# Patient Record
Sex: FEMALE | Race: WHITE | Employment: UNEMPLOYED | URBAN - METROPOLITAN AREA
[De-identification: names, ages, dates, MRNs, and addresses within clinical notes are randomized per-mention and may not be internally consistent; named-entity substitution may affect disease eponyms.]

---

## 2024-01-19 ENCOUNTER — HOSPITAL ENCOUNTER (EMERGENCY)
Facility: HOSPITAL | Age: 24
Discharge: HOME | End: 2024-01-19
Attending: EMERGENCY MEDICINE
Payer: COMMERCIAL

## 2024-01-19 ENCOUNTER — APPOINTMENT (EMERGENCY)
Dept: ULTRASOUND IMAGING | Facility: HOSPITAL | Age: 24
End: 2024-01-19
Payer: COMMERCIAL

## 2024-01-19 ENCOUNTER — APPOINTMENT (EMERGENCY)
Dept: CT IMAGING | Facility: HOSPITAL | Age: 24
End: 2024-01-19
Payer: COMMERCIAL

## 2024-01-19 VITALS
WEIGHT: 165 LBS | RESPIRATION RATE: 16 BRPM | OXYGEN SATURATION: 98 % | BODY MASS INDEX: 30.36 KG/M2 | SYSTOLIC BLOOD PRESSURE: 113 MMHG | HEART RATE: 75 BPM | TEMPERATURE: 98.6 F | HEIGHT: 62 IN | DIASTOLIC BLOOD PRESSURE: 72 MMHG

## 2024-01-19 DIAGNOSIS — N70.91 SALPINGITIS: Primary | ICD-10-CM

## 2024-01-19 DIAGNOSIS — R10.30 LOWER ABDOMINAL PAIN: ICD-10-CM

## 2024-01-19 LAB
ALBUMIN SERPL BCP-MCNC: 4.5 G/DL (ref 3.5–5)
ALP SERPL-CCNC: 72 U/L (ref 34–104)
ALT SERPL W P-5'-P-CCNC: 13 U/L (ref 7–52)
ANION GAP SERPL CALCULATED.3IONS-SCNC: 7 MMOL/L
AST SERPL W P-5'-P-CCNC: 12 U/L (ref 13–39)
B-HCG SERPL-ACNC: 14 MIU/ML (ref 0–5)
BACTERIA UR QL AUTO: ABNORMAL /HPF
BASOPHILS # BLD AUTO: 0.02 THOUSANDS/ÂΜL (ref 0–0.1)
BASOPHILS NFR BLD AUTO: 0 % (ref 0–1)
BILIRUB SERPL-MCNC: 0.48 MG/DL (ref 0.2–1)
BILIRUB UR QL STRIP: NEGATIVE
BUN SERPL-MCNC: 7 MG/DL (ref 5–25)
C TRACH DNA SPEC QL NAA+PROBE: NEGATIVE
CALCIUM SERPL-MCNC: 9.2 MG/DL (ref 8.4–10.2)
CANDIDA RRNA VAG QL PROBE: NOT DETECTED
CHLORIDE SERPL-SCNC: 103 MMOL/L (ref 96–108)
CLARITY UR: CLEAR
CO2 SERPL-SCNC: 25 MMOL/L (ref 21–32)
COLOR UR: COLORLESS
CREAT SERPL-MCNC: 0.57 MG/DL (ref 0.6–1.3)
EOSINOPHIL # BLD AUTO: 0.1 THOUSAND/ÂΜL (ref 0–0.61)
EOSINOPHIL NFR BLD AUTO: 1 % (ref 0–6)
ERYTHROCYTE [DISTWIDTH] IN BLOOD BY AUTOMATED COUNT: 12.4 % (ref 11.6–15.1)
G VAGINALIS RRNA GENITAL QL PROBE: DETECTED
GFR SERPL CREATININE-BSD FRML MDRD: 131 ML/MIN/1.73SQ M
GLUCOSE SERPL-MCNC: 105 MG/DL (ref 65–140)
GLUCOSE UR STRIP-MCNC: NEGATIVE MG/DL
HCT VFR BLD AUTO: 39.2 % (ref 34.8–46.1)
HGB BLD-MCNC: 12.8 G/DL (ref 11.5–15.4)
HGB UR QL STRIP.AUTO: NEGATIVE
IMM GRANULOCYTES # BLD AUTO: 0.02 THOUSAND/UL (ref 0–0.2)
IMM GRANULOCYTES NFR BLD AUTO: 0 % (ref 0–2)
KETONES UR STRIP-MCNC: NEGATIVE MG/DL
LEUKOCYTE ESTERASE UR QL STRIP: ABNORMAL
LIPASE SERPL-CCNC: 21 U/L (ref 11–82)
LYMPHOCYTES # BLD AUTO: 1.79 THOUSANDS/ÂΜL (ref 0.6–4.47)
LYMPHOCYTES NFR BLD AUTO: 22 % (ref 14–44)
MCH RBC QN AUTO: 29.7 PG (ref 26.8–34.3)
MCHC RBC AUTO-ENTMCNC: 32.7 G/DL (ref 31.4–37.4)
MCV RBC AUTO: 91 FL (ref 82–98)
MONOCYTES # BLD AUTO: 0.38 THOUSAND/ÂΜL (ref 0.17–1.22)
MONOCYTES NFR BLD AUTO: 5 % (ref 4–12)
N GONORRHOEA DNA SPEC QL NAA+PROBE: NEGATIVE
NEUTROPHILS # BLD AUTO: 5.76 THOUSANDS/ÂΜL (ref 1.85–7.62)
NEUTS SEG NFR BLD AUTO: 72 % (ref 43–75)
NITRITE UR QL STRIP: NEGATIVE
NON-SQ EPI CELLS URNS QL MICRO: ABNORMAL /HPF
NRBC BLD AUTO-RTO: 0 /100 WBCS
PH UR STRIP.AUTO: 6.5 [PH]
PLATELET # BLD AUTO: 294 THOUSANDS/UL (ref 149–390)
PMV BLD AUTO: 10.2 FL (ref 8.9–12.7)
POTASSIUM SERPL-SCNC: 3.9 MMOL/L (ref 3.5–5.3)
PROT SERPL-MCNC: 7.9 G/DL (ref 6.4–8.4)
PROT UR STRIP-MCNC: NEGATIVE MG/DL
RBC # BLD AUTO: 4.31 MILLION/UL (ref 3.81–5.12)
RBC #/AREA URNS AUTO: ABNORMAL /HPF
SODIUM SERPL-SCNC: 135 MMOL/L (ref 135–147)
SP GR UR STRIP.AUTO: 1.01 (ref 1–1.03)
T VAGINALIS RRNA GENITAL QL PROBE: DETECTED
UROBILINOGEN UR STRIP-ACNC: <2 MG/DL
WBC # BLD AUTO: 8.07 THOUSAND/UL (ref 4.31–10.16)
WBC #/AREA URNS AUTO: ABNORMAL /HPF

## 2024-01-19 PROCEDURE — 36415 COLL VENOUS BLD VENIPUNCTURE: CPT

## 2024-01-19 PROCEDURE — 96365 THER/PROPH/DIAG IV INF INIT: CPT

## 2024-01-19 PROCEDURE — 96372 THER/PROPH/DIAG INJ SC/IM: CPT

## 2024-01-19 PROCEDURE — 85025 COMPLETE CBC W/AUTO DIFF WBC: CPT

## 2024-01-19 PROCEDURE — 80053 COMPREHEN METABOLIC PANEL: CPT

## 2024-01-19 PROCEDURE — 87491 CHLMYD TRACH DNA AMP PROBE: CPT

## 2024-01-19 PROCEDURE — 76856 US EXAM PELVIC COMPLETE: CPT

## 2024-01-19 PROCEDURE — 96375 TX/PRO/DX INJ NEW DRUG ADDON: CPT

## 2024-01-19 PROCEDURE — 87510 GARDNER VAG DNA DIR PROBE: CPT | Performed by: PHYSICIAN ASSISTANT

## 2024-01-19 PROCEDURE — 76830 TRANSVAGINAL US NON-OB: CPT

## 2024-01-19 PROCEDURE — 99285 EMERGENCY DEPT VISIT HI MDM: CPT

## 2024-01-19 PROCEDURE — 87660 TRICHOMONAS VAGIN DIR PROBE: CPT | Performed by: PHYSICIAN ASSISTANT

## 2024-01-19 PROCEDURE — 83690 ASSAY OF LIPASE: CPT

## 2024-01-19 PROCEDURE — 87591 N.GONORRHOEAE DNA AMP PROB: CPT

## 2024-01-19 PROCEDURE — 81001 URINALYSIS AUTO W/SCOPE: CPT

## 2024-01-19 PROCEDURE — 74177 CT ABD & PELVIS W/CONTRAST: CPT

## 2024-01-19 PROCEDURE — 96361 HYDRATE IV INFUSION ADD-ON: CPT

## 2024-01-19 PROCEDURE — 99284 EMERGENCY DEPT VISIT MOD MDM: CPT

## 2024-01-19 PROCEDURE — 87480 CANDIDA DNA DIR PROBE: CPT | Performed by: PHYSICIAN ASSISTANT

## 2024-01-19 PROCEDURE — 84702 CHORIONIC GONADOTROPIN TEST: CPT

## 2024-01-19 RX ORDER — METRONIDAZOLE 500 MG/1
500 TABLET ORAL ONCE
Status: COMPLETED | OUTPATIENT
Start: 2024-01-19 | End: 2024-01-19

## 2024-01-19 RX ORDER — DOXYCYCLINE HYCLATE 100 MG/1
100 CAPSULE ORAL 2 TIMES DAILY
Qty: 27 CAPSULE | Refills: 0 | Status: SHIPPED | OUTPATIENT
Start: 2024-01-19 | End: 2024-02-02

## 2024-01-19 RX ORDER — KETOROLAC TROMETHAMINE 30 MG/ML
15 INJECTION, SOLUTION INTRAMUSCULAR; INTRAVENOUS ONCE
Status: COMPLETED | OUTPATIENT
Start: 2024-01-19 | End: 2024-01-19

## 2024-01-19 RX ORDER — LIDOCAINE 50 MG/G
1 PATCH TOPICAL ONCE
Status: DISCONTINUED | OUTPATIENT
Start: 2024-01-19 | End: 2024-01-19 | Stop reason: HOSPADM

## 2024-01-19 RX ORDER — ACETAMINOPHEN 10 MG/ML
1000 INJECTION, SOLUTION INTRAVENOUS ONCE
Status: COMPLETED | OUTPATIENT
Start: 2024-01-19 | End: 2024-01-19

## 2024-01-19 RX ORDER — METRONIDAZOLE 500 MG/1
500 TABLET ORAL EVERY 12 HOURS SCHEDULED
Qty: 27 TABLET | Refills: 0 | Status: SHIPPED | OUTPATIENT
Start: 2024-01-19 | End: 2024-02-02

## 2024-01-19 RX ORDER — DOXYCYCLINE HYCLATE 100 MG/1
100 CAPSULE ORAL ONCE
Status: COMPLETED | OUTPATIENT
Start: 2024-01-19 | End: 2024-01-19

## 2024-01-19 RX ORDER — ONDANSETRON 2 MG/ML
4 INJECTION INTRAMUSCULAR; INTRAVENOUS ONCE
Status: COMPLETED | OUTPATIENT
Start: 2024-01-19 | End: 2024-01-19

## 2024-01-19 RX ADMIN — LIDOCAINE HYDROCHLORIDE 500 MG: 10 INJECTION, SOLUTION EPIDURAL; INFILTRATION; INTRACAUDAL; PERINEURAL at 09:08

## 2024-01-19 RX ADMIN — MORPHINE SULFATE 2 MG: 2 INJECTION, SOLUTION INTRAMUSCULAR; INTRAVENOUS at 04:51

## 2024-01-19 RX ADMIN — ONDANSETRON 4 MG: 2 INJECTION INTRAMUSCULAR; INTRAVENOUS at 03:55

## 2024-01-19 RX ADMIN — ACETAMINOPHEN 1000 MG: 10 INJECTION INTRAVENOUS at 03:55

## 2024-01-19 RX ADMIN — METRONIDAZOLE 500 MG: 500 TABLET ORAL at 09:08

## 2024-01-19 RX ADMIN — SODIUM CHLORIDE 1000 ML: 0.9 INJECTION, SOLUTION INTRAVENOUS at 03:56

## 2024-01-19 RX ADMIN — LIDOCAINE 5% 1 PATCH: 700 PATCH TOPICAL at 04:51

## 2024-01-19 RX ADMIN — IOHEXOL 100 ML: 350 INJECTION, SOLUTION INTRAVENOUS at 08:17

## 2024-01-19 RX ADMIN — DOXYCYCLINE HYCLATE 100 MG: 100 CAPSULE ORAL at 09:08

## 2024-01-19 RX ADMIN — KETOROLAC TROMETHAMINE 15 MG: 30 INJECTION, SOLUTION INTRAMUSCULAR; INTRAVENOUS at 03:55

## 2024-01-19 NOTE — DISCHARGE INSTRUCTIONS
Take antibiotics as prescribed. Do not drink alcohol while taking the antibiotic.    Take Tylenol 650mg and ibuprofen 600mg every 6 hours as needed for pain. Apply heating pad to affected area.    Please follow up with OBGYN on Monday. Return to the ER with any worsening symptoms, severe pain, fevers.

## 2024-01-19 NOTE — ED PROVIDER NOTES
History  Chief Complaint   Patient presents with    Abdominal Pain     X 1 week, had  a month ago but did not go to follow up now having back pain & abdominal cramping      Patient is a 23-year-old who presents to the emergency room today for 2 weeks of symptoms.  Patient reports that she had an  on 2023 in [place].  Reports ultrasound was performed and they confirmed IUP.  Of note, documented IUP not confirmed in the patient's chart.  Patient was given pills which she took as prescribed.  However, patient did not follow-up with  clinic.  Patient reports that she was seen by GI on 2024 for viral gastroenteritis.  Patient reports 2 weeks of abdominal pain, low back pain, nausea and vomiting.  Associated vaginal odor and discharge.  Denies any other symptoms at this time.  Has tried ibuprofen for symptomatic relief, has not taken any medication for symptom relief today.  Patient reports that she is not currently sexually active but would like to be tested for STIs.  Last menstrual period October.      Abdominal Pain  Associated symptoms: nausea, vaginal discharge (and vaginal odor) and vomiting    Associated symptoms: no chest pain, no chills, no dysuria, no fever, no hematuria and no shortness of breath        Prior to Admission Medications   Prescriptions Last Dose Informant Patient Reported? Taking?   Levonorgestrel (MIRENA) 20 MCG/DAY IUD   Yes No   Si each by Intrauterine route once   Patient not taking: Reported on 2024   Norethin-Eth Estrad-Fe Biphas (Lo Loestrin Fe) 1 MG-10 MCG / 10 MCG TABS   No No   Sig: Take 1 tablet by mouth in the morning   Patient not taking: Reported on 2024   dicyclomine (BENTYL) 10 mg capsule   No No   Sig: Take 1 capsule (10 mg total) by mouth 4 (four) times a day (before meals and at bedtime) for 5 days   ibuprofen (MOTRIN) 600 mg tablet  Self No No   Sig: Take 1 tablet (600 mg total) by mouth every 6 (six) hours as needed for mild  pain   ondansetron (ZOFRAN) 4 mg tablet   No No   Sig: Take 1 tablet (4 mg total) by mouth every 8 (eight) hours as needed for nausea or vomiting for up to 5 days   phenazopyridine (PYRIDIUM) 200 mg tablet   No No   Sig: Take 1 tablet (200 mg total) by mouth 3 (three) times a day   Patient not taking: Reported on 2024      Facility-Administered Medications: None       History reviewed. No pertinent past medical history.    Past Surgical History:   Procedure Laterality Date    INDUCED       [place] LAPS ABD PRTM&OMENTUM DX W/WO SPEC BR/WA SPX N/A 2020    Procedure: LAPAROSCOPY DIAGNOSTIC, left salpingectomy,;  Surgeon: Ivania Alston DO;  Location: [facility];  Service: Gynecology       Family History   Problem Relation Age of Onset    No Known Problems Mother     No Known Problems Father      I have reviewed and agree with the history as documented.    E-Cigarette/Vaping    E-Cigarette Use Never User      E-Cigarette/Vaping Substances    Nicotine No     THC No     CBD No     Flavoring No     Other No     Unknown No      Social History     Tobacco Use    Smoking status: Never    Smokeless tobacco: Never   Vaping Use    Vaping status: Never Used   Substance Use Topics    Alcohol use: No    Drug use: No       Review of Systems   Constitutional:  Negative for chills and fever.   HENT:  Negative for trouble swallowing and voice change.    Eyes:  Negative for photophobia and redness.   Respiratory:  Negative for shortness of breath.    Cardiovascular:  Negative for chest pain.   Gastrointestinal:  Positive for abdominal pain, nausea and vomiting. Negative for abdominal distention.   Genitourinary:  Positive for vaginal discharge (and vaginal odor). Negative for dysuria, flank pain and hematuria.   Musculoskeletal:  Positive for back pain (Bilateral low back pain). Negative for joint swelling.   Skin:  Negative for color change.   Neurological:  Negative for facial asymmetry and speech difficulty.    Psychiatric/Behavioral:  Negative for confusion.        Physical Exam  Physical Exam  Vitals and nursing note reviewed.   Constitutional:       General: She is not in acute distress.     Appearance: She is well-developed.   HENT:      Head: Normocephalic and atraumatic.   Eyes:      Conjunctiva/sclera: Conjunctivae normal.   Cardiovascular:      Rate and Rhythm: Normal rate and regular rhythm.      Heart sounds: No murmur heard.  Pulmonary:      Effort: Pulmonary effort is normal. No respiratory distress.      Breath sounds: Normal breath sounds.   Abdominal:      General: Abdomen is flat.      Palpations: Abdomen is soft.      Tenderness: There is abdominal tenderness in the right lower quadrant, suprapubic area and left lower quadrant. There is no right CVA tenderness or left CVA tenderness.   Musculoskeletal:         General: No swelling.      Cervical back: Neck supple.        Back:    Skin:     General: Skin is warm and dry.      Capillary Refill: Capillary refill takes less than 2 seconds.   Neurological:      Mental Status: She is alert.   Psychiatric:         Mood and Affect: Mood normal.         Vital Signs  ED Triage Vitals [01/19/24 0317]   Temperature Pulse Respirations Blood Pressure SpO2   98.6 °F (37 °C) 94 18 131/65 100 %      Temp Source Heart Rate Source Patient Position - Orthostatic VS BP Location FiO2 (%)   Oral Monitor Sitting Right arm --      Pain Score       9           Vitals:    01/19/24 0317 01/19/24 0358 01/19/24 0500 01/19/24 0600   BP: 131/65 110/74 113/72    Pulse: 94 73 64 75   Patient Position - Orthostatic VS: Sitting Lying Lying          Visual Acuity  Visual Acuity      Flowsheet Row Most Recent Value   L Pupil Size (mm) 3   R Pupil Size (mm) 3            ED Medications  Medications   sodium chloride 0.9 % bolus 1,000 mL (0 mL Intravenous Stopped 1/19/24 7055)   ondansetron (ZOFRAN) injection 4 mg (4 mg Intravenous Given 1/19/24 0373)   ketorolac (TORADOL) injection 15 mg (15  mg Intravenous Given 1/19/24 2205)   acetaminophen (Ofirmev) injection 1,000 mg (0 mg Intravenous Stopped 1/19/24 9503)   morphine injection 2 mg (2 mg Intravenous Given 1/19/24 7061)   iohexol (OMNIPAQUE) 350 MG/ML injection (MULTI-DOSE) 100 mL (100 mL Intravenous Given 1/19/24 0817)   cefTRIAXone (ROCEPHIN) 500 mg in lidocaine (PF) (XYLOCAINE-MPF) 1 % IM only syringe (500 mg Intramuscular Given 1/19/24 0908)   doxycycline hyclate (VIBRAMYCIN) capsule 100 mg (100 mg Oral Given 1/19/24 0908)   metroNIDAZOLE (FLAGYL) tablet 500 mg (500 mg Oral Given 1/19/24 0908)       Diagnostic Studies  Results Reviewed       Procedure Component Value Units Date/Time    Vaginosis DNA Probe [[id number]]  (Abnormal) Collected: 01/19/24    Lab Status: Final result Specimen: Genital Updated: 01/19/24 1323    Narrative:      The following orders were created for panel order Vaginosis DNA Probe.  Procedure                               Abnormality         Status                     ---------                               -----------         ------                     VAGINOSIS DNA PROBE (AFF...[[id number]]  Abnormal            Final result                 Please view results for these tests on the individual orders.    VAGINOSIS DNA PROBE (AFFIRM) [[id number]]  (Abnormal) Collected: 01/19/24    Lab Status: Final result Specimen: Genital Updated: 01/19/24 1323     Trichomonas vaginalis Detected     Gardnerella vaginalis Detected     Candida Species Not Detected    Narrative:      This test was performed using the FDA-approved BD Affirm™ VPIII Microbial Identification Test. This test is a DNA probe test based on the principles of nucleic acid hybridization. This test is intended for use in the detection and identification of Candida species, Gardnerella vaginalis, and Trichomonas vaginalis nucleic acid in vaginal fluid specimens.   All results should be interpreted in conjunction with clinical presentation and other laboratory markers.  Results are determined by the presence or absence of color on the teat bead.  “Detected” results for Candida, Gardnerella, and/or Trichomonas indicates that nucleic acid for Candida species (C. albicans, C. glabrata, C. kefyr, C. krusei, C. parapsilosis, C. tropicalis), G. vaginalis, and/or T. vaginalis, respectively, is present in the sample and indicates the patient has candidiasis, bacterial vaginosis, and/or trichomoniasis when consistent with clinical signs and symptoms. Simultaneous infections by more than one organism are common.   “Not Detected” results for Candida, Gardnerella, or Trichomonas indicates that nucleic acid for Candida species (C. albicans, C. glabrata, C. kefyr, C. krusei, C. parapsilosis, C. tropicalis), G. vaginalis, and/or T. vaginalis, respectively, is not present in the sample and suggests the patient does not have candidiasis, bacterial vaginosis, and/or trichomoniasis, respectively, when consistent with clinical signs and symptoms.   “Invalid” results for a sample indicate the specimen has repeated and results are invalid. Specimen recollection should be considered.     Procedural Limitations   The assay is intended to be used with the BD SGB VPIII Ambient Temperature Transport System, the WooMeIII Sample Collection Set, or the swabs provided in the BD Affirm VPIII Microbial Identification Kit. Other methods of collection have not been evaluated.  Optimal test results require appropriate specimen collection. Test results may be affected by improper specimen collection, handling and/or storage conditions. A negative test result does not exclude the possibility of vaginitis/vaginosis.  When using the BD Affirm VPIII Ambient Temperature Transport System, specimens held longer than 72 h at ambient (15 to 30 °C)or refrigerated (2 to 8 °C) conditions may cause false results. When using the BD SGB VPIII Sample Collection Kit or the swabs provided in the BD Affirm VPIII Microbial  Identification Kit, specimens held longer than 1 h at room temperature or 4 h at 2 to 8 °C prior to preparation may cause false results. Prepared specimens held longer than 24 h at room temperature prior to processing may give inaccurate results.  When performing this test, the temperature of the testing environment must be 22 to 28 °C.  A negative result for Candida, Gardnerella and/or Trichomonas indicates nucleic acid from less than 1 x 104 Candida cells, 2 x 105 CFU of G. vaginalis or 5 x 103 trichomonads, respectively, may be present in the patient sample.  The BD [employer] VPIII Microbial Identification Test detects the presence of G. vaginalis at concentrations of greater than 2 x 105 CFU per patient sample. The diagnostic value of this level of detection is not definitive.  The presence of G. vaginalis, although suggestive, is not diagnostic for bacterial vaginosis. As in many clinical situations, diagnosis should not be based on the results of a single laboratory test. Results should be interpreted in conjunction with other clinical and laboratory data available to the clinician such as pH, amine odor, clue cells and vaginal discharge characteristics.  Women with vaginal discharge should be evaluated for risk factors of cervicitis and pelvic inflammatory disease, and if present, evaluated for other organisms, including N. gonorrhoeae and C. trachomatis.  Vaginitis/Vaginosis is most frequently caused by G. vaginalis, Es species, and T. vaginalis. Vaginitis symptoms may also be seen in toxic shock syndrome (caused by Staphylococcus aureus) or may be caused by non-specific factors or by specific organisms. Mixed infections may occur. Therefore, a test indicating the presence of Candida species, G. vaginalis, and/or T. vaginalis, does not rule out the presence of other organisms, including Mobiluncus mulieris, Mycoplasma hominis, and/or Prevotella bivia.  Cryptococcus neoformans at concentrations greater than  1 x 108 yeast/mL react with the BD Affirm VPIII Microbial Identification Test for Candida species. C. neoformans is only rarely encountered in the vagina. M. mulieris at concentrations greater than 4 x 106 bacteria/mL and Bifidobacterium dentium at concentrations greater than 8 x 105 bacteria/mL may react non-specifically with the BD Affirm VPIII Microbial Identification Test for G. vaginalis. B. dentium is rarely encountered in the vagina.  The BD Affirm VPIII Microbial Identification Test method is for use with vaginal fluid specimens from patients with symptoms of vaginitis/vaginosis. Performance with other specimens or other patient populations has not been established.  The performance of this test on patient specimens collected during or immediately after antimicrobial therapy is unknown. The presence or absence of Candida species, G. vaginalis or T. vaginalis cannot be used as a test for therapeutic success or failure.   Adulteration of reagents or failure to follow instructions exactly as set forth in the directions for use may adversely affect performance as described in the labeling    Chlamydia/GC amplified DNA by PCR [[id number]]  (Normal) Collected: 01/19/24 0601    Lab Status: Final result Specimen: Urine, Other Updated: 01/19/24 1151     N gonorrhoeae, DNA Probe Negative     Chlamydia trachomatis, DNA Probe Negative    Narrative:      This test was performed using the FDA-approved Alireza 6800 CT/NG assay (Roche [employer]). This test uses real-time PCR to detect Chlamydia trachomatis (CT) and Neisseria gonorrhoeae (NG). This instrument and assay have been validated by the  and performing laboratory and verified by the performing laboratory.  This test is intended as an aid in the diagnosis of chlamydial and gonococcal disease. This test has not been evaluated in patients younger than 14 years of age and is not recommended for evaluation of suspected sexual abuse. This assay is not intended  to replace other exams or tests for diagnosis of urogenital infection by causative factors other than Chalmydia trachomatis (CT) and Neisseria gonorrhoeae (NG). Additional testing is recommended when the results do not correlate with clinical signs and symptoms.   Procedural Limitations  This assay has only been validated for use with male and female urine, clinician-instructed self-collected vaginal swab specimens, clinician-collected vaginal swab specimens, endocervical swab specimens collected in jr® PCR Media and cervical specimens collected in PreservCyt® Solution. Assay performance has not been validated for use with other collection media and/or specimen types.   Detection of C. trachomatis and N. gonorrhoeaea is dependent on the number of organisms present in the specimen. Detection may be affected by specimen collection methods, patient factors, stage of infection, infecting strains, and presence of polymerase/PCR inhibitors.   When CT is present at very high concentrations, the detection of NG present at concentrations near the limit of detection may be impacted.  The presence of mucus in endocervical specimens may lead to false negative results.  The presence of whole blood in urine and cervical specimens collected in PreservCyt Solution may lead to false negative and/or invalid test results.   Urine testing is recommended to be performed on first catch urine samples. The effects of other collection variables have not been evaluated at this time. The effects of vaginal discharge, tampon use, douching, and other collection variables have not been evaluated at this time.   This assay has not been evaluated with patients currently being treated with antimicrobial agents active against CT or NG, or patients with a history of hysterectomy.     Urine Microscopic [[id number]]  (Abnormal) Collected: 01/19/24 0601    Lab Status: Final result Specimen: Urine, Clean Catch Updated: 01/19/24 0634     RBC, UA 1-2  /hpf      WBC, UA 2-4 /hpf      Epithelial Cells Occasional /hpf      Bacteria, UA None Seen /hpf     UA w Reflex to Microscopic w Reflex to Culture [[id number]]  (Abnormal) Collected: 01/19/24 0601    Lab Status: Final result Specimen: Urine, Clean Catch Updated: 01/19/24 0630     Color, UA Colorless     Clarity, UA Clear     Specific Gravity, UA 1.009     pH, UA 6.5     Leukocytes, UA Small     Nitrite, UA Negative     Protein, UA Negative mg/dl      Glucose, UA Negative mg/dl      Ketones, UA Negative mg/dl      Urobilinogen, UA <2.0 mg/dl      Bilirubin, UA Negative     Occult Blood, UA Negative    Quantitative hCG [[id number]]  (Abnormal) Collected: 01/19/24 0349    Lab Status: Final result Specimen: Blood from Arm, Left Updated: 01/19/24 0428     HCG, Quant 14 mIU/mL     Narrative:       Expected Ranges:    HCG results between 5 and 25 mIU/mL may be indicative of early pregnancy but should be interpreted in light of the total clinical presentation.    HCG can rise to detectable levels in katherine and post menopausal women (0-11.6 mIU/mL).     Approximate               Approximate HCG  Gestation age          Concentration ( mIU/mL)  _____________          ______________________   Weeks                      HCG values  0.2-1                       5-50  1-2                           2-3                         100-5000  3-4                         500-92714  4-5                         1000-15385  5-6                         [id number]  6-8                         [id number]  8-12                        90999-495001      Comprehensive metabolic panel [[id number]]  (Abnormal) Collected: 01/19/24 0349    Lab Status: Final result Specimen: Blood from Arm, Left Updated: 01/19/24 0419     Sodium 135 mmol/L      Potassium 3.9 mmol/L      Chloride 103 mmol/L      CO2 25 mmol/L      ANION GAP 7 mmol/L      BUN 7 mg/dL      Creatinine 0.57 mg/dL      Glucose 105 mg/dL      Calcium 9.2 mg/dL      AST 12 U/L      ALT 13  U/L      Alkaline Phosphatase 72 U/L      Total Protein 7.9 g/dL      Albumin 4.5 g/dL      Total Bilirubin 0.48 mg/dL      eGFR 131 ml/min/1.73sq m     Narrative:      National Kidney Disease Foundation guidelines for Chronic Kidney Disease (CKD):     Stage 1 with normal or high GFR (GFR > 90 mL/min/1.73 square meters)    Stage 2 Mild CKD (GFR = 60-89 mL/min/1.73 square meters)    Stage 3A Moderate CKD (GFR = 45-59 mL/min/1.73 square meters)    Stage 3B Moderate CKD (GFR = 30-44 mL/min/1.73 square meters)    Stage 4 Severe CKD (GFR = 15-29 mL/min/1.73 square meters)    Stage 5 End Stage CKD (GFR <15 mL/min/1.73 square meters)  Note: GFR calculation is accurate only with a steady state creatinine    Lipase [[id number]]  (Normal) Collected: 01/19/24 0349    Lab Status: Final result Specimen: Blood from Arm, Left Updated: 01/19/24 0419     Lipase 21 u/L     CBC and differential [[id number]] Collected: 01/19/24 0349    Lab Status: Final result Specimen: Blood from Arm, Left Updated: 01/19/24 0400     WBC 8.07 Thousand/uL      RBC 4.31 Million/uL      Hemoglobin 12.8 g/dL      Hematocrit 39.2 %      MCV 91 fL      MCH 29.7 pg      MCHC 32.7 g/dL      RDW 12.4 %      MPV 10.2 fL      Platelets 294 Thousands/uL      nRBC 0 /100 WBCs      Neutrophils Relative 72 %      Immat GRANS % 0 %      Lymphocytes Relative 22 %      Monocytes Relative 5 %      Eosinophils Relative 1 %      Basophils Relative 0 %      Neutrophils Absolute 5.76 Thousands/µL      Immature Grans Absolute 0.02 Thousand/uL      Lymphocytes Absolute 1.79 Thousands/µL      Monocytes Absolute 0.38 Thousand/µL      Eosinophils Absolute 0.10 Thousand/µL      Basophils Absolute 0.02 Thousands/µL                    CT abdomen pelvis with contrast   Final Result by Randy Piper MD (01/19 0835)      No acute intra-abdominal abnormality.         Workstation performed: [id number]         US pelvis complete w transvaginal   Final Result by Florentino Cai MD  "( 653)      Thickening of the right fallopian tube up to 6 mm without significant hydrosalpinx or adnexal mass. If the patient has right pelvic pain, salpingitis could be considered.      No intrauterine gestation or retained products of conception.      Clinical follow-up will determine the need for repeat pelvic ultrasound in 3 to 6 weeks.      This examination was marked \"immediate notification\" in Epic in order to begin the standard process by which the radiology reading room liaison alerts the referring practitioner.            Workstation performed: [id number]                    Procedures  Procedures         ED Course  ED Course as of 246      0416 FAST US exam negative                               SBIRT 22yo+      Flowsheet Row Most Recent Value   MAYE: How many times in the past year have you...    Used an illegal drug or used a prescription medication for non-medical reasons? Never Filed at: 2024 0319                      Medical Decision Making  23-year-old female presenting today for abdominal pain, low back pain, nausea, vomiting, vaginal odor and discharge.  Recent   that she never followed up for.  Reports confirmed IUP, however, not documented in chart.  Vital signs stable.  On exam, patient has abdominal tenderness to the right lower quadrant, suprapubic area and left lower quadrant.  Additional bilateral musculoskeletal tenderness to lower back.  Negative FAST exam.  Labs unremarkable.  Ultrasound pelvis pending.  I gave signout to Rossana Iyer PA-C. See note.     Amount and/or Complexity of Data Reviewed  Labs: ordered.  Radiology: ordered.    Risk  Prescription drug management.             Disposition  Final diagnoses:   Salpingitis   Lower abdominal pain     Time reflects when diagnosis was documented in both MDM as applicable and the Disposition within this note       Time User Action Codes Description Comment    2024  9:04 AM " Rossana Iyer Add [N70.91] Salpingitis     1/19/2024  9:04 AM Rossana Iyer Add [R10.30] Lower abdominal pain           ED Disposition       ED Disposition   Discharge    Condition   Stable    Date/Time   Fri Jan 19, 2024 0904    Comment   Tamika Langford discharge to home/self care.                   Follow-up Information       Follow up With Specialties Details Why Contact Info Additional Information    Josie Mccray MD Obstetrics and Gynecology On 1/22/2024  [address] [place] [address]  [address]  [facility] [address]  [phone]       [facility] Emergency Medicine  If symptoms worsen [address] [facility] [id number] [phone] [facility], [address] [place], [place], [address]            Discharge Medication List as of 1/19/2024  9:06 AM        START taking these medications    Details   doxycycline hyclate (VIBRAMYCIN) 100 mg capsule Take 1 capsule (100 mg total) by mouth 2 (two) times a day for 14 days, Starting Fri 1/19/2024, Until Fri 2/2/2024, Normal      metroNIDAZOLE (FLAGYL) 500 mg tablet Take 1 tablet (500 mg total) by mouth every 12 (twelve) hours for 14 days, Starting Fri 1/19/2024, Until Fri 2/2/2024, Normal           CONTINUE these medications which have NOT CHANGED    Details   dicyclomine (BENTYL) 10 mg capsule Take 1 capsule (10 mg total) by mouth 4 (four) times a day (before meals and at bedtime) for 5 days, Starting Tue 1/9/2024, Until Sun 1/14/2024, Normal      ibuprofen (MOTRIN) 600 mg tablet Take 1 tablet (600 mg total) by mouth every 6 (six) hours as needed for mild pain, Starting Sun 4/5/2020, No Print      Levonorgestrel (MIRENA) 20 MCG/DAY IUD 1 each by Intrauterine route once, Historical Med      Norethin-Eth Estrad-Fe Biphas (Lo Loestrin Fe) 1 MG-10 MCG / 10 MCG TABS Take 1 tablet by mouth in the morning, Starting Mon 10/17/2022, Normal      ondansetron  (ZOFRAN) 4 mg tablet Take 1 tablet (4 mg total) by mouth every 8 (eight) hours as needed for nausea or vomiting for up to 5 days, Starting Tue 1/9/2024, Until Sun 1/14/2024 at 2359, Normal      phenazopyridine (PYRIDIUM) 200 mg tablet Take 1 tablet (200 mg total) by mouth 3 (three) times a day, Starting Tue 7/28/2020, Normal             No discharge procedures on file.    PDMP Review       None            ED Provider  Electronically Signed by             Venice Hatch PA-C  01/19/24 8283

## 2024-01-19 NOTE — ED CARE HANDOFF
Emergency Department Sign Out Note        Sign out and transfer of care from Venice Hatch PA-C. See Separate Emergency Department note.     The patient, Tamika Langford, was evaluated by the previous provider for abdominal pain.    Workup Completed:  Labs    Results Reviewed       Procedure Component Value Units Date/Time    Molecular Vaginal Panel [[id number]] Collected: 01/19/24 0833    Lab Status: In process Specimen: Genital from Vaginal Updated: 01/19/24 0841    Urine Microscopic [[id number]]  (Abnormal) Collected: 01/19/24 0601    Lab Status: Final result Specimen: Urine, Clean Catch Updated: 01/19/24 0634     RBC, UA 1-2 /hpf      WBC, UA 2-4 /hpf      Epithelial Cells Occasional /hpf      Bacteria, UA None Seen /hpf     UA w Reflex to Microscopic w Reflex to Culture [[id number]]  (Abnormal) Collected: 01/19/24 0601    Lab Status: Final result Specimen: Urine, Clean Catch Updated: 01/19/24 0630     Color, UA Colorless     Clarity, UA Clear     Specific Gravity, UA 1.009     pH, UA 6.5     Leukocytes, UA Small     Nitrite, UA Negative     Protein, UA Negative mg/dl      Glucose, UA Negative mg/dl      Ketones, UA Negative mg/dl      Urobilinogen, UA <2.0 mg/dl      Bilirubin, UA Negative     Occult Blood, UA Negative    Chlamydia/GC amplified DNA by PCR [[id number]] Collected: 01/19/24 0601    Lab Status: In process Specimen: Urine, Other Updated: 01/19/24 0610    Quantitative hCG [[id number]]  (Abnormal) Collected: 01/19/24 0349    Lab Status: Final result Specimen: Blood from Arm, Left Updated: 01/19/24 0428     HCG, Quant 14 mIU/mL     Narrative:       Expected Ranges:    HCG results between 5 and 25 mIU/mL may be indicative of early pregnancy but should be interpreted in light of the total clinical presentation.    HCG can rise to detectable levels in katherine and post menopausal women (0-11.6 mIU/mL).     Approximate               Approximate HCG  Gestation age          Concentration (  mIU/mL)  _____________          ______________________   Weeks                      HCG values  0.2-1                       5-50  1-2                           2-3                         100-5000  3-4                         500-51149  4-5                         1000-50953  5-6                         [id number]  6-8                         [id number]  8-12                        [id number]      Comprehensive metabolic panel [[id number]]  (Abnormal) Collected: 01/19/24 0349    Lab Status: Final result Specimen: Blood from Arm, Left Updated: 01/19/24 0419     Sodium 135 mmol/L      Potassium 3.9 mmol/L      Chloride 103 mmol/L      CO2 25 mmol/L      ANION GAP 7 mmol/L      BUN 7 mg/dL      Creatinine 0.57 mg/dL      Glucose 105 mg/dL      Calcium 9.2 mg/dL      AST 12 U/L      ALT 13 U/L      Alkaline Phosphatase 72 U/L      Total Protein 7.9 g/dL      Albumin 4.5 g/dL      Total Bilirubin 0.48 mg/dL      eGFR 131 ml/min/1.73sq m     Narrative:      National Kid[employer] guidelines for Chronic Kidney Disease (CKD):     Stage 1 with normal or high GFR (GFR > 90 mL/min/1.73 square meters)    Stage 2 Mild CKD (GFR = 60-89 mL/min/1.73 square meters)    Stage 3A Moderate CKD (GFR = 45-59 mL/min/1.73 square meters)    Stage 3B Moderate CKD (GFR = 30-44 mL/min/1.73 square meters)    Stage 4 Severe CKD (GFR = 15-29 mL/min/1.73 square meters)    Stage 5 End Stage CKD (GFR <15 mL/min/1.73 square meters)  Note: GFR calculation is accurate only with a steady state creatinine    Lipase [[id number]]  (Normal) Collected: 01/19/24 0349    Lab Status: Final result Specimen: Blood from Arm, Left Updated: 01/19/24 0419     Lipase 21 u/L     CBC and differential [[id number]] Collected: 01/19/24 0349    Lab Status: Final result Specimen: Blood from Arm, Left Updated: 01/19/24 0400     WBC 8.07 Thousand/uL      RBC 4.31 Million/uL      Hemoglobin 12.8 g/dL      Hematocrit 39.2 %      MCV 91 fL      MCH 29.7 pg      " MCHC 32.7 g/dL      RDW 12.4 %      MPV 10.2 fL      Platelets 294 Thousands/uL      nRBC 0 /100 WBCs      Neutrophils Relative 72 %      Immat GRANS % 0 %      Lymphocytes Relative 22 %      Monocytes Relative 5 %      Eosinophils Relative 1 %      Basophils Relative 0 %      Neutrophils Absolute 5.76 Thousands/µL      Immature Grans Absolute 0.02 Thousand/uL      Lymphocytes Absolute 1.79 Thousands/µL      Monocytes Absolute 0.38 Thousand/µL      Eosinophils Absolute 0.10 Thousand/µL      Basophils Absolute 0.02 Thousands/µL               ED Course / Workup Pending (followup):  TVUS        CT abdomen pelvis with contrast    Result Date: 1/19/2024  Impression: No acute intra-abdominal abnormality. Workstation performed: [id number]     US pelvis complete w transvaginal    Result Date: 1/19/2024  Impression: Thickening of the right fallopian tube up to 6 mm without significant hydrosalpinx or adnexal mass. If the patient has right pelvic pain, salpingitis could be considered. No intrauterine gestation or retained products of conception. Clinical follow-up will determine the need for repeat pelvic ultrasound in 3 to 6 weeks. This examination was marked \"immediate notification\" in Epic in order to begin the standard process by which the radiology reading room liaison alerts the referring practitioner. Workstation performed: [id number]           ED Course as of 01/19/24 0910   Fri Jan 19, 2024   0812 Imaging shows enlarged R fallopian tube, possible salpingitis. Patient evaluated. Pain is mostly left sided. Will add on CT scan to r/o alternative etiologies of her pain.    [id number] OBGYN paged.    3224 D/w Dr. Romero, on call OBGYN. She recommends treating for PID with IM Rocephin, doxy x 14 days, and Flagyl x 14 days. Patient has a f/u with OBGYN scheduled on Monday.            Procedures  Medical Decision Making  In brief, this is a 23yoF who is presenting with lower abdominal pain x 2 weeks. Also c/o watery " "vaginal discharge. Hx of medical  on 23. I received this patient in sign out awaiting pelvic ultrasound results. See previous provider note for full details. Labs show a quantitative HCG of 14 but are otherwise unremarkable.    Pelvic ultrasound shows \"Thickening of the right fallopian tube up to 6 mm without significant hydrosalpinx or adnexal mass. If the patient has right pelvic pain, salpingitis could be considered.\" CT added due to left sided pain on exam which is unremarkable. Pelvic exam performed. Patient had significant discomfort during exam although she states she typically has pain during exams. There was right adnexal tenderness present. No significant vaginal discharge noted. Molecular vaginal panel obtained. Discussed case with OBGYN who recommends treating patient for PID. IM Rocephin ordered and she was started on a 14 day course of doxycycline and Flagyl. She has an appt with OBGYN on Monday for f/u. ED return precautions discussed. Patient expressed understanding and is agreeable to plan. Patient discharged in stable condition.        Problems Addressed:  Lower abdominal pain: acute illness or injury  Salpingitis: acute illness or injury    Amount and/or Complexity of Data Reviewed  Labs: ordered.  Radiology: ordered.    Risk  Prescription drug management.            Disposition  Final diagnoses:   Salpingitis   Lower abdominal pain     Time reflects when diagnosis was documented in both MDM as applicable and the Disposition within this note       Time User Action Codes Description Comment    2024  9:04 AM Rossana Iyer Add [N70.91] Salpingitis     2024  9:04 AM Rossana Iyer Add [R10.30] Lower abdominal pain           ED Disposition       ED Disposition   Discharge    Condition   Stable    Date/Time     9:04 AM    Comment   Tamika Langford discharge to home/self care.                   Follow-up Information       Follow up With Specialties " Details Why Contact Info Additional Information    Josie Mccray MD Obstetrics and Gynecology On 1/22/2024  [address]  [address]  [place] [address]  [phone]       [facility] Emergency Department Emergency Medicine  If symptoms worsen [address] [facility] [id number] [phone] [facility] Emergency Department, [address] [place], [place], [address]          Patient's Medications   Discharge Prescriptions    DOXYCYCLINE HYCLATE (VIBRAMYCIN) 100 MG CAPSULE    Take 1 capsule (100 mg total) by mouth 2 (two) times a day for 14 days       Start Date: 1/19/2024 End Date: 2/2/2024       Order Dose: 100 mg       Quantity: 27 capsule    Refills: 0    METRONIDAZOLE (FLAGYL) 500 MG TABLET    Take 1 tablet (500 mg total) by mouth every 12 (twelve) hours for 14 days       Start Date: 1/19/2024 End Date: 2/2/2024       Order Dose: 500 mg       Quantity: 27 tablet    Refills: 0     No discharge procedures on file.       ED Provider  Electronically Signed by     Rossana Iyer PA-C  01/19/24 0915

## 2024-01-19 NOTE — RESULT ENCOUNTER NOTE
Patient called and notified of positive trichomonas and gardnerella testing. She prescribed Flagyl during ED visit. She was advised to notify all partners and f/u with OBGYN.

## 2024-01-22 ENCOUNTER — OFFICE VISIT (OUTPATIENT)
Dept: OBGYN CLINIC | Facility: MEDICAL CENTER | Age: 24
End: 2024-01-22
Payer: COMMERCIAL

## 2024-01-22 ENCOUNTER — APPOINTMENT (OUTPATIENT)
Dept: LAB | Facility: MEDICAL CENTER | Age: 24
End: 2024-01-22
Payer: COMMERCIAL

## 2024-01-22 VITALS — SYSTOLIC BLOOD PRESSURE: 116 MMHG | DIASTOLIC BLOOD PRESSURE: 74 MMHG | BODY MASS INDEX: 30.11 KG/M2 | WEIGHT: 164.6 LBS

## 2024-01-22 DIAGNOSIS — Z32.02 NEGATIVE PREGNANCY TEST: ICD-10-CM

## 2024-01-22 DIAGNOSIS — Z32.01 POSITIVE PREGNANCY TEST: ICD-10-CM

## 2024-01-22 DIAGNOSIS — N70.91 SALPINGITIS: ICD-10-CM

## 2024-01-22 DIAGNOSIS — Z30.013 ENCOUNTER FOR INITIAL PRESCRIPTION OF INJECTABLE CONTRACEPTIVE: Primary | ICD-10-CM

## 2024-01-22 PROBLEM — Z97.5 IUD (INTRAUTERINE DEVICE) IN PLACE: Status: RESOLVED | Noted: 2021-08-30 | Resolved: 2024-01-22

## 2024-01-22 LAB
B-HCG SERPL-ACNC: 9 MIU/ML (ref 0–5)
SL AMB POCT URINE HCG: NEGATIVE

## 2024-01-22 PROCEDURE — 36415 COLL VENOUS BLD VENIPUNCTURE: CPT

## 2024-01-22 PROCEDURE — 81025 URINE PREGNANCY TEST: CPT | Performed by: STUDENT IN AN ORGANIZED HEALTH CARE EDUCATION/TRAINING PROGRAM

## 2024-01-22 PROCEDURE — 84702 CHORIONIC GONADOTROPIN TEST: CPT

## 2024-01-22 PROCEDURE — 99213 OFFICE O/P EST LOW 20 MIN: CPT | Performed by: STUDENT IN AN ORGANIZED HEALTH CARE EDUCATION/TRAINING PROGRAM

## 2024-01-22 RX ORDER — MEDROXYPROGESTERONE ACETATE 150 MG/ML
150 INJECTION, SUSPENSION INTRAMUSCULAR
Qty: 1 ML | Refills: 4 | Status: SHIPPED | OUTPATIENT
Start: 2024-01-22

## 2024-01-22 NOTE — PROGRESS NOTES
Assessment/Plan:    Salpingitis  Pelvic pain and imaging findings concerning for salpingitis  Testing with trich and BV, neg GCCT  Given rocephin in the ER and recommended to take 2 wks doxy, flagyl  Repeat US planned at annual      Positive pregnancy test  HCG 14 in the ER 1 month after medical termination  Recommend repeat quant today to confirm resolution    Encounter for initial prescription of injectable contraceptive  Would like to trial depo     Diagnoses and all orders for this visit:    Encounter for initial prescription of injectable contraceptive  -     medroxyPROGESTERone (DEPO-PROVERA) 150 mg/mL injection; Inject 1 mL (150 mg total) into a muscle every 3 (three) months    Positive pregnancy test  -     hCG, quantitative; Future    Negative pregnancy test  -     POCT urine HCG    Salpingitis          Subjective:      Patient ID: Tamika Langford is a 23 y.o. female.    24 yo here for annual exam. Had a pregnancy 10-12/23. Termination with pills at end of December. Bled for 2 wks. 2 wks ago started with nausea, diarrhea, pelvic pain. Seen in UC and thought it was food poisoning, this has continued. She went to the ER for severe pelvic cramping and back pain. Found to have likely salpingitis. She has been feeling better since then with intermittent cramps. She reports she is taking her antibiotics. She is not currently sexually active but would like birth control. Aware to return for annual.         The following portions of the patient's history were reviewed and updated as appropriate: allergies, current medications, past family history, past medical history, past social history, past surgical history, and problem list.    Review of Systems   Constitutional:  Negative for chills and fever.   Respiratory:  Negative for shortness of breath.    Cardiovascular:  Negative for chest pain.   Gastrointestinal:  Negative for abdominal pain, constipation, diarrhea and nausea.   Genitourinary:  Positive for  pelvic pain. Negative for dyspareunia, dysuria, frequency, urgency, vaginal bleeding, vaginal discharge and vaginal pain.         Objective:      /74 (BP Location: Left arm, Patient Position: Sitting, Cuff Size: Standard)   Wt 74.7 kg (164 lb 9.6 oz)   LMP 10/27/2023 (Approximate)   BMI 30.11 kg/m²          Physical Exam  Vitals reviewed.   Constitutional:       General: She is not in acute distress.     Appearance: She is well-developed. She is not diaphoretic.   HENT:      Head: Normocephalic and atraumatic.   Pulmonary:      Effort: Pulmonary effort is normal. No respiratory distress.   Genitourinary:     Labia:         Right: No rash, tenderness, lesion or injury.         Left: No rash, tenderness, lesion or injury.       Vagina: No vaginal discharge, erythema, tenderness or bleeding.      Cervix: No cervical motion tenderness, friability, lesion, erythema or cervical bleeding.      Uterus: Not enlarged and not tender.       Adnexa: Right adnexa normal and left adnexa normal.        Right: No mass, tenderness or fullness.          Left: No mass, tenderness or fullness.     Musculoskeletal:      Cervical back: Normal range of motion.   Neurological:      Mental Status: She is alert and oriented to person, place, and time.   Psychiatric:         Behavior: Behavior normal.         Thought Content: Thought content normal.         Judgment: Judgment normal.

## 2024-01-22 NOTE — ASSESSMENT & PLAN NOTE
Pelvic pain and imaging findings concerning for salpingitis  Testing with trich and BV, neg GCCT  Given rocephin in the ER and recommended to take 2 wks doxy, flagyl  Repeat US planned at annual

## 2024-01-22 NOTE — ASSESSMENT & PLAN NOTE
HCG 14 in the ER 1 month after medical termination  Recommend repeat quant today to confirm resolution

## 2024-01-23 ENCOUNTER — OFFICE VISIT (OUTPATIENT)
Dept: OBGYN CLINIC | Facility: MEDICAL CENTER | Age: 24
End: 2024-01-23
Payer: COMMERCIAL

## 2024-01-23 VITALS — BODY MASS INDEX: 29.81 KG/M2 | WEIGHT: 163 LBS | SYSTOLIC BLOOD PRESSURE: 106 MMHG | DIASTOLIC BLOOD PRESSURE: 70 MMHG

## 2024-01-23 DIAGNOSIS — Z30.42 ENCOUNTER FOR DEPO-PROVERA CONTRACEPTION: Primary | ICD-10-CM

## 2024-01-23 DIAGNOSIS — Z32.01 POSITIVE PREGNANCY TEST: ICD-10-CM

## 2024-01-23 PROCEDURE — 96372 THER/PROPH/DIAG INJ SC/IM: CPT

## 2024-01-23 RX ORDER — MEDROXYPROGESTERONE ACETATE 150 MG/ML
150 INJECTION, SUSPENSION INTRAMUSCULAR ONCE
Status: COMPLETED | OUTPATIENT
Start: 2024-01-23 | End: 2024-01-23

## 2024-01-23 RX ADMIN — MEDROXYPROGESTERONE ACETATE 150 MG: 150 INJECTION, SUSPENSION INTRAMUSCULAR at 09:51

## 2024-01-23 NOTE — PROGRESS NOTES
Pt is here for Depo Provera injection. This is her first injection.  Her annual exam is scheduled 6/4/24.  Urine pregnancy test done: N-had negative pregnancy test at appointment on 1/22/24 per Dr Christina.      Result: NA  Depo given in L Buttock  Tolerated well.  Lot [id number] Exp 01/31/2026  Next dose due 4/9/24-4/23/24.   Refill needed No

## 2024-02-14 ENCOUNTER — TELEPHONE (OUTPATIENT)
Dept: OBGYN CLINIC | Facility: MEDICAL CENTER | Age: 24
End: 2024-02-14

## 2024-04-09 ENCOUNTER — CLINICAL SUPPORT (OUTPATIENT)
Dept: OBGYN CLINIC | Facility: MEDICAL CENTER | Age: 24
End: 2024-04-09
Payer: COMMERCIAL

## 2024-04-09 VITALS — WEIGHT: 165 LBS | BODY MASS INDEX: 30.18 KG/M2 | SYSTOLIC BLOOD PRESSURE: 104 MMHG | DIASTOLIC BLOOD PRESSURE: 80 MMHG

## 2024-04-09 DIAGNOSIS — Z30.42 ENCOUNTER FOR DEPO-PROVERA CONTRACEPTION: Primary | ICD-10-CM

## 2024-04-09 PROCEDURE — 96372 THER/PROPH/DIAG INJ SC/IM: CPT

## 2024-04-09 RX ORDER — MEDROXYPROGESTERONE ACETATE 150 MG/ML
150 INJECTION, SUSPENSION INTRAMUSCULAR ONCE
Status: COMPLETED | OUTPATIENT
Start: 2024-04-09 | End: 2024-04-09

## 2024-04-09 RX ADMIN — MEDROXYPROGESTERONE ACETATE 150 MG: 150 INJECTION, SUSPENSION INTRAMUSCULAR at 10:59

## 2024-04-09 NOTE — PROGRESS NOTES
Pt is here for Depo Provera injection. Her last dose was 1/23/24.  Her annual exam is scheduled for 6/4/24.  Urine pregnancy test done: N      Result: NA  Depo given in L Buttock  Tolerated well.  Lot [id number] Exp 3/2026  Next dose due 6/25-7/8.   Refill needed No

## 2024-04-29 ENCOUNTER — APPOINTMENT (EMERGENCY)
Dept: CT IMAGING | Facility: HOSPITAL | Age: 24
End: 2024-04-29
Payer: COMMERCIAL

## 2024-04-29 ENCOUNTER — HOSPITAL ENCOUNTER (EMERGENCY)
Facility: HOSPITAL | Age: 24
Discharge: HOME | End: 2024-04-29
Attending: EMERGENCY MEDICINE
Payer: COMMERCIAL

## 2024-04-29 VITALS
RESPIRATION RATE: 17 BRPM | HEART RATE: 65 BPM | SYSTOLIC BLOOD PRESSURE: 119 MMHG | DIASTOLIC BLOOD PRESSURE: 60 MMHG | OXYGEN SATURATION: 99 % | TEMPERATURE: 98.2 F

## 2024-04-29 DIAGNOSIS — S09.90XA INJURY OF HEAD, INITIAL ENCOUNTER: Primary | ICD-10-CM

## 2024-04-29 DIAGNOSIS — S06.0XAA CONCUSSION: ICD-10-CM

## 2024-04-29 LAB
EXT PREGNANCY TEST URINE: NEGATIVE
EXT. CONTROL: NORMAL

## 2024-04-29 PROCEDURE — 81025 URINE PREGNANCY TEST: CPT | Performed by: EMERGENCY MEDICINE

## 2024-04-29 PROCEDURE — 99284 EMERGENCY DEPT VISIT MOD MDM: CPT | Performed by: EMERGENCY MEDICINE

## 2024-04-29 PROCEDURE — 99284 EMERGENCY DEPT VISIT MOD MDM: CPT

## 2024-04-29 PROCEDURE — 96372 THER/PROPH/DIAG INJ SC/IM: CPT

## 2024-04-29 PROCEDURE — 70450 CT HEAD/BRAIN W/O DYE: CPT

## 2024-04-29 RX ORDER — KETOROLAC TROMETHAMINE 30 MG/ML
30 INJECTION, SOLUTION INTRAMUSCULAR; INTRAVENOUS ONCE
Status: COMPLETED | OUTPATIENT
Start: 2024-04-29 | End: 2024-04-29

## 2024-04-29 RX ORDER — ACETAMINOPHEN 325 MG/1
975 TABLET ORAL ONCE
Status: COMPLETED | OUTPATIENT
Start: 2024-04-29 | End: 2024-04-29

## 2024-04-29 RX ORDER — ONDANSETRON 4 MG/1
4 TABLET, FILM COATED ORAL EVERY 6 HOURS PRN
Qty: 12 TABLET | Refills: 0 | Status: SHIPPED | OUTPATIENT
Start: 2024-04-29

## 2024-04-29 RX ADMIN — ACETAMINOPHEN 975 MG: 325 TABLET, FILM COATED ORAL at 17:15

## 2024-04-29 RX ADMIN — KETOROLAC TROMETHAMINE 30 MG: 30 INJECTION, SOLUTION INTRAMUSCULAR; INTRAVENOUS at 17:15

## 2024-04-29 NOTE — ED PROVIDER NOTES
History  Chief Complaint   Patient presents with    Head Injury     Pt was getting out of car on Saturday when shoe got caught, pt states falling backwards onto ground. Pt denies any LOC, c/o now of L sided headache, nausea, and bump to back of head. Pt sent over by urgent care. No BT     24-year-old female no significant reported past history presenting with head injury.  Patient reports that 2 days ago she was trying to get out of a car when she lost her balance and fell striking the backside of her head.  Denies LOC.  Denies blood thinning medications.  Denies any neurological changes such as motor or sensory deficits.  Patient reports nausea without vomiting and photophobia and persistent headache.  Denies any chest pain shortness of breath.  Seen at urgent care earlier today and sent to ER for further evaluation and imaging.  Denies any other complaints.  Chart reviewed.    History reviewed. No pertinent past medical history.  Family History: non-contributory  Social History          Prior to Admission Medications   Prescriptions Last Dose Informant Patient Reported? Taking?   Levonorgestrel (MIRENA) 20 MCG/DAY IUD   Yes No   Si each by Intrauterine route once   Patient not taking: Reported on 2024   Norethin-Eth Estrad-Fe Biphas (Lo Loestrin Fe) 1 MG-10 MCG / 10 MCG TABS   No No   Sig: Take 1 tablet by mouth in the morning   Patient not taking: Reported on 2024   dicyclomine (BENTYL) 10 mg capsule   No No   Sig: Take 1 capsule (10 mg total) by mouth 4 (four) times a day (before meals and at bedtime) for 5 days   ibuprofen (MOTRIN) 600 mg tablet  Self No No   Sig: Take 1 tablet (600 mg total) by mouth every 6 (six) hours as needed for mild pain   Patient not taking: Reported on 2024   medroxyPROGESTERone (DEPO-PROVERA) 150 mg/mL injection   No No   Sig: Inject 1 mL (150 mg total) into a muscle every 3 (three) months   ondansetron (ZOFRAN) 4 mg tablet   No No   Sig: Take 1 tablet (4 mg total) by  mouth every 8 (eight) hours as needed for nausea or vomiting for up to 5 days   phenazopyridine (PYRIDIUM) 200 mg tablet   No No   Sig: Take 1 tablet (200 mg total) by mouth 3 (three) times a day   Patient not taking: Reported on 2024      Facility-Administered Medications: None       History reviewed. No pertinent past medical history.    Past Surgical History:   Procedure Laterality Date    INDUCED       TX LAPS ABD PRTM&OMENTUM DX W/WO SPEC BR/WA SPX N/A 2020    Procedure: LAPAROSCOPY DIAGNOSTIC, left salpingectomy,;  Surgeon: Ivania Alston DO;  Location: [facility];  Service: Gynecology       Family History   Problem Relation Age of Onset    No Known Problems Mother     No Known Problems Father      I have reviewed and agree with the history as documented.    E-Cigarette/Vaping    E-Cigarette Use Never User      E-Cigarette/Vaping Substances    Nicotine No     THC No     CBD No     Flavoring No     Other No     Unknown No      Social History     Tobacco Use    Smoking status: Never    Smokeless tobacco: Never   Vaping Use    Vaping status: Never Used   Substance Use Topics    Alcohol use: No    Drug use: No       Review of Systems   Constitutional:  Negative for appetite change, chills, diaphoresis, fever and unexpected weight change.   HENT:  Negative for congestion and rhinorrhea.    Eyes:  Positive for photophobia. Negative for visual disturbance.   Respiratory:  Negative for cough, chest tightness and shortness of breath.    Cardiovascular:  Negative for chest pain, palpitations and leg swelling.   Gastrointestinal:  Positive for nausea. Negative for abdominal distention, abdominal pain, blood in stool, constipation, diarrhea and vomiting.   Genitourinary:  Negative for dysuria and hematuria.   Musculoskeletal:  Negative for back pain, joint swelling, neck pain and neck stiffness.   Skin:  Negative for color change, pallor, rash and wound.   Neurological:  Positive for headaches. Negative  for dizziness, syncope, weakness and light-headedness.   Psychiatric/Behavioral:  Negative for agitation.    All other systems reviewed and are negative.      Physical Exam  Physical Exam  Vitals and nursing note reviewed.   Constitutional:       General: She is not in acute distress.     Appearance: Normal appearance. She is well-developed. She is not ill-appearing, toxic-appearing or diaphoretic.   HENT:      Head: Normocephalic.      Comments: Small hematoma with tenderness to left posterior occiput     Nose: Nose normal. No congestion or rhinorrhea.      Mouth/Throat:      Mouth: Mucous membranes are moist.      Pharynx: Oropharynx is clear. No oropharyngeal exudate or posterior oropharyngeal erythema.   Eyes:      General: No scleral icterus.        Right eye: No discharge.         Left eye: No discharge.      Extraocular Movements: Extraocular movements intact.      Conjunctiva/sclera: Conjunctivae normal.      Pupils: Pupils are equal, round, and reactive to light.   Neck:      Vascular: No JVD.      Trachea: No tracheal deviation.      Comments: Supple. Normal range of motion.   Cardiovascular:      Rate and Rhythm: Normal rate and regular rhythm.      Heart sounds: Normal heart sounds. No murmur heard.     No friction rub. No gallop.      Comments: Normal rate and regular rhythm  Pulmonary:      Effort: Pulmonary effort is normal. No respiratory distress.      Breath sounds: Normal breath sounds. No stridor. No wheezing or rales.      Comments: Clear to auscultation bilaterally  Chest:      Chest wall: No tenderness.   Abdominal:      General: Bowel sounds are normal. There is no distension.      Palpations: Abdomen is soft.      Tenderness: There is no abdominal tenderness. There is no right CVA tenderness, left CVA tenderness, guarding or rebound.      Comments: Soft, nontender, nondistended.  Normal bowel sounds throughout   Musculoskeletal:         General: No swelling, tenderness, deformity or signs of  injury. Normal range of motion.      Cervical back: Normal range of motion and neck supple. No rigidity or tenderness. No muscular tenderness.      Right lower leg: No edema.      Left lower leg: No edema.   Lymphadenopathy:      Cervical: No cervical adenopathy.   Skin:     General: Skin is warm and dry.      Coloration: Skin is not pale.      Findings: No erythema or rash.   Neurological:      General: No focal deficit present.      Mental Status: She is alert. Mental status is at baseline.      Sensory: No sensory deficit.      Motor: No weakness or abnormal muscle tone.      Coordination: Coordination normal.      Gait: Gait normal.      Comments: Alert.  Strength and sensation grossly intact.  Ambulatory without difficulty at baseline.    Psychiatric:         Behavior: Behavior normal.         Thought Content: Thought content normal.         Vital Signs  ED Triage Vitals [04/29/24 1610]   Temperature Pulse Respirations Blood Pressure SpO2   98.2 °F (36.8 °C) 65 17 119/60 99 %      Temp Source Heart Rate Source Patient Position - Orthostatic VS BP Location FiO2 (%)   Oral Monitor Sitting Left arm --      Pain Score       7           Vitals:    04/29/24 1610   BP: 119/60   Pulse: 65   Patient Position - Orthostatic VS: Sitting         Visual Acuity      ED Medications  Medications   acetaminophen (TYLENOL) tablet 975 mg (975 mg Oral Given 4/29/24 1715)   ketorolac (TORADOL) injection 30 mg (30 mg Intramuscular Given 4/29/24 1715)       Diagnostic Studies  Results Reviewed       Procedure Component Value Units Date/Time    POCT pregnancy, urine [[id number]]  (Normal) Resulted: 04/29/24 1714    Lab Status: Final result Updated: 04/29/24 1714     EXT Preg Test, Ur Negative     Control Valid                   CT head without contrast   Final Result by Martin Sheth MD (04/29 1903)      No acute intracranial abnormality.                  Workstation performed: [id number]                     Procedures  Procedures         ED Course                                             Medical Decision Making  24-year-old female no significant reported past history presenting with head injury.  Constellation of symptoms consistent with likely concussion.  Sent in for imaging.  Plan for CT imaging.  Symptom management with oral and IM medications.  No current nausea.  Reassess.    Symptoms improving with medications.  CT no significant acute process.  Suspect likely concussion.  Concussion and head injury precautions. Discussed results and recommendations. Advised follow up PCP. Medication recommendations. Given instructions and return precautions. Patient/family at bedside acknowledged understanding of all written and verbal instructions and return precautions. Discharged.     Amount and/or Complexity of Data Reviewed  Labs: ordered.  Radiology: ordered.    Risk  OTC drugs.  Prescription drug management.             Disposition  Final diagnoses:   Injury of head, initial encounter   Concussion     Time reflects when diagnosis was documented in both MDM as applicable and the Disposition within this note       Time User Action Codes Description Comment    4/29/2024  5:10 PM William Prado Add [S09.90XA] Injury of head, initial encounter     4/29/2024  5:10 PM William Prado Add [S06.0XAA] Concussion           ED Disposition       ED Disposition   Discharge    Condition   Stable    Date/Time   Mon Apr 29, 2024  6:39 PM    Comment   Tamika Langford discharge to home/self care.                   Follow-up Information       Follow up With Specialties Details Why Contact Info    [employer]  Call  for [phone]              Discharge Medication List as of 4/29/2024  6:39 PM        CONTINUE these medications which have CHANGED    Details   ondansetron (ZOFRAN) 4 mg tablet Take 1 tablet (4 mg total) by mouth every 6 (six) hours as needed for nausea or vomiting, Starting Mon 4/29/2024, Normal           CONTINUE  these medications which have NOT CHANGED    Details   dicyclomine (BENTYL) 10 mg capsule Take 1 capsule (10 mg total) by mouth 4 (four) times a day (before meals and at bedtime) for 5 days, Starting Tue 1/9/2024, Until Mon 1/22/2024, Normal      ibuprofen (MOTRIN) 600 mg tablet Take 1 tablet (600 mg total) by mouth every 6 (six) hours as needed for mild pain, Starting Sun 4/5/2020, No Print      Levonorgestrel (MIRENA) 20 MCG/DAY IUD 1 each by Intrauterine route once, Historical Med      medroxyPROGESTERone (DEPO-PROVERA) 150 mg/mL injection Inject 1 mL (150 mg total) into a muscle every 3 (three) months, Starting Mon 1/22/2024, Normal      Norethin-Eth Estrad-Fe Biphas (Lo Loestrin Fe) 1 MG-10 MCG / 10 MCG TABS Take 1 tablet by mouth in the morning, Starting Mon 10/17/2022, Normal      phenazopyridine (PYRIDIUM) 200 mg tablet Take 1 tablet (200 mg total) by mouth 3 (three) times a day, Starting Tue 7/28/2020, Normal             No discharge procedures on file.    PDMP Review       None            ED Provider  Electronically Signed by             William Prado MD  04/29/24 9712

## 2024-06-26 ENCOUNTER — CLINICAL SUPPORT (OUTPATIENT)
Dept: OBGYN CLINIC | Facility: CLINIC | Age: 24
End: 2024-06-26
Payer: COMMERCIAL

## 2024-06-26 VITALS — BODY MASS INDEX: 30.73 KG/M2 | HEIGHT: 62 IN | WEIGHT: 167 LBS

## 2024-06-26 DIAGNOSIS — Z30.42 ENCOUNTER FOR DEPO-PROVERA CONTRACEPTION: Primary | ICD-10-CM

## 2024-06-26 PROCEDURE — 96372 THER/PROPH/DIAG INJ SC/IM: CPT

## 2024-06-26 RX ORDER — MEDROXYPROGESTERONE ACETATE 150 MG/ML
150 INJECTION, SUSPENSION INTRAMUSCULAR ONCE
Status: COMPLETED | OUTPATIENT
Start: 2024-06-26 | End: 2024-06-26

## 2024-06-26 RX ADMIN — MEDROXYPROGESTERONE ACETATE 150 MG: 150 INJECTION, SUSPENSION INTRAMUSCULAR at 10:52

## 2024-06-26 NOTE — PROGRESS NOTES
Pt is here for Depo Provera injection. Her last dose was 4/9/2024.  Her annual exam was on 6/4/2024.  Urine pregnancy test done: No Patient is in the safe window dates      Depo given in  Left buttock   Tolerated well. Yes  Lot [id number] Exp 07/31/2026  Next dose due 9/11/2024--9/25/2024.   Refill needed NO       Patient reports no concerns on the depo she states that it has help out with her cramps .

## 2024-09-18 ENCOUNTER — TELEPHONE (OUTPATIENT)
Age: 24
End: 2024-09-18

## 2024-09-18 NOTE — TELEPHONE ENCOUNTER
Patient called to reschedule her depo shot realized she had it written down for 91/6, attempted to warm transfer with no answer. Please advise.

## 2024-09-19 NOTE — TELEPHONE ENCOUNTER
Left message for Ms. Langford to call and reschedule her Depo Injection.  Her last injection was administered on 6/26/24.  She can be rescheduled up until 9/25/24.  Please advise.

## 2024-09-25 ENCOUNTER — CLINICAL SUPPORT (OUTPATIENT)
Age: 24
End: 2024-09-25
Payer: COMMERCIAL

## 2024-09-25 VITALS
HEIGHT: 62 IN | DIASTOLIC BLOOD PRESSURE: 62 MMHG | BODY MASS INDEX: 31.58 KG/M2 | WEIGHT: 171.6 LBS | HEART RATE: 80 BPM | TEMPERATURE: 97.3 F | SYSTOLIC BLOOD PRESSURE: 102 MMHG

## 2024-09-25 DIAGNOSIS — Z30.42 ENCOUNTER FOR MANAGEMENT AND INJECTION OF DEPO-PROVERA: Primary | ICD-10-CM

## 2024-09-25 PROCEDURE — 96372 THER/PROPH/DIAG INJ SC/IM: CPT

## 2024-09-25 RX ORDER — MEDROXYPROGESTERONE ACETATE 150 MG/ML
150 INJECTION, SUSPENSION INTRAMUSCULAR ONCE
Status: COMPLETED | OUTPATIENT
Start: 2024-09-25 | End: 2024-09-25

## 2024-09-25 RX ADMIN — MEDROXYPROGESTERONE ACETATE 150 MG: 150 INJECTION, SUSPENSION INTRAMUSCULAR at 12:23

## 2024-09-25 NOTE — PROGRESS NOTES
Pt is here for Depo Provera injection. Her last dose was 6/26/2024.  Her annual exam was on 1/22/2024.  Urine pregnancy test done: No  Depo given in R Buttock  Tolerated well.  Lot [id number] Exp 11/30/2026  Next dose due 12/11-12/25/2024.   Refill needed No

## 2024-12-11 ENCOUNTER — ANNUAL EXAM (OUTPATIENT)
Age: 24
End: 2024-12-11
Payer: COMMERCIAL

## 2024-12-11 VITALS
HEIGHT: 62 IN | DIASTOLIC BLOOD PRESSURE: 64 MMHG | BODY MASS INDEX: 31.47 KG/M2 | SYSTOLIC BLOOD PRESSURE: 104 MMHG | WEIGHT: 171 LBS | HEART RATE: 81 BPM

## 2024-12-11 DIAGNOSIS — Z01.419 ENCOUNTER FOR GYNECOLOGICAL EXAMINATION (GENERAL) (ROUTINE) WITHOUT ABNORMAL FINDINGS: Primary | ICD-10-CM

## 2024-12-11 DIAGNOSIS — N63.14 MASS OF LOWER INNER QUADRANT OF RIGHT BREAST: ICD-10-CM

## 2024-12-11 DIAGNOSIS — Z11.3 SCREEN FOR STD (SEXUALLY TRANSMITTED DISEASE): ICD-10-CM

## 2024-12-11 PROCEDURE — 99395 PREV VISIT EST AGE 18-39: CPT | Performed by: OBSTETRICS & GYNECOLOGY

## 2024-12-11 PROCEDURE — 87491 CHLMYD TRACH DNA AMP PROBE: CPT | Performed by: OBSTETRICS & GYNECOLOGY

## 2024-12-11 PROCEDURE — G0145 SCR C/V CYTO,THINLAYER,RESCR: HCPCS | Performed by: OBSTETRICS & GYNECOLOGY

## 2024-12-11 PROCEDURE — 87591 N.GONORRHOEAE DNA AMP PROB: CPT | Performed by: OBSTETRICS & GYNECOLOGY

## 2024-12-11 NOTE — PROGRESS NOTES
"A:  Yearly exam.     P:   - Pap collected today. Reviewed ASCCP guidelines for pap smear  - STI testing, gcct from pap   - gardasil up to date  - diet and exercise reviewed  - pt stopping depo, reviewed return of menses sp depo.   - check breast u/s for small breast lump under right breast    RTO for yearly exam or sooner prn      CC:  Yearly exam    S:  24 y.o. female here for yearly exam.   She has been using depo provera for birth control- amenorrheic on depo.   Noticed small \"cyst\" under right breast- it was painful and swollen.   Was hard and tender- thinks it has resolved.     Was using depo provera for menstrual regulation  Very heavy.   Wants to take a break     Menarche: 14 years old.     LMP: amenorrheic on depo  Contraception: depo, abstinence  Last Pap: due  Last Mammo: n/a  Last Colonoscopy: n/a    Non-smoker, social drinker  Denies drug use  Exercises irregularly, balanced diet     Sexual activity: She is not sexually active without pain, bleeding or dryness.     STD testing:  She does want STD testing today.     Gardasil:  She has had the Gardasil series.     Family hx of breast cancer: denies  Family hx of ovarian cancer: denies  Family hx of colon cancer:  denies      Current Outpatient Medications:     medroxyPROGESTERone (DEPO-PROVERA) 150 mg/mL injection, Inject 1 mL (150 mg total) into a muscle every 3 (three) months, Disp: 1 mL, Rfl: 4    dicyclomine (BENTYL) 10 mg capsule, Take 1 capsule (10 mg total) by mouth 4 (four) times a day (before meals and at bedtime) for 5 days (Patient not taking: Reported on 2024), Disp: 20 capsule, Rfl: 0    ibuprofen (MOTRIN) 600 mg tablet, Take 1 tablet (600 mg total) by mouth every 6 (six) hours as needed for mild pain (Patient not taking: Reported on 2024), Disp: 30 tablet, Rfl: 0    ondansetron (ZOFRAN) 4 mg tablet, Take 1 tablet (4 mg total) by mouth every 8 (eight) hours as needed for nausea or vomiting for up to 5 days (Patient not taking: " Reported on 12/11/2024), Disp: 15 tablet, Rfl: 0    ondansetron (ZOFRAN) 4 mg tablet, Take 1 tablet (4 mg total) by mouth every 6 (six) hours as needed for nausea or vomiting (Patient not taking: Reported on 12/11/2024), Disp: 12 tablet, Rfl: 0    phenazopyridine (PYRIDIUM) 200 mg tablet, Take 1 tablet (200 mg total) by mouth 3 (three) times a day (Patient not taking: Reported on 12/11/2024), Disp: 6 tablet, Rfl: 0  Social History     Socioeconomic History    Marital status: Single     Spouse name: Not on file    Number of children: Not on file    Years of education: Not on file    Highest education level: Not on file   Occupational History    Not on file   Tobacco Use    Smoking status: Never    Smokeless tobacco: Never   Vaping Use    Vaping status: Never Used   Substance and Sexual Activity    Alcohol use: No    Drug use: No    Sexual activity: Not Currently     Partners: Male     Birth control/protection: Condom Male, Injection   Other Topics Concern    Not on file   Social History Narrative    Not on file     Social Drivers of Health     Financial Resource Strain: Not on file   Food Insecurity: Not on file   Transportation Needs: Not on file   Physical Activity: Not on file   Stress: Not on file   Social Connections: Unknown (6/18/2024)    Received from [employer]     How often do you feel lonely or isolated from those around you? (Adult - for ages 18 years and over): Not on file   Intimate Partner Violence: Not on file   Housing Stability: Not on file     Family History   Problem Relation Age of Onset    No Known Problems Mother     No Known Problems Father       History reviewed. No pertinent past medical history.     Review of Systems   Respiratory: Negative.    Cardiovascular: Negative.    Gastrointestinal: Negative for constipation and diarrhea.   Genitourinary: Negative for difficulty urinating, pelvic pain, vaginal bleeding, vaginal discharge, itching or odor.    O:  Blood pressure  "104/64, pulse 81, height 5' 2\" (1.575 m), weight 77.6 kg (171 lb), not currently breastfeeding.    Exam was chaperoned.  Patient appears well and is not in distress  Neck is supple without masses  Breasts are symmetrical without nipple discharge, skin changes or adenopathy.   Right breast with small area of discoloration, tenderness and induration vs discrete mass on underside of breast  Abdomen is soft and nontender without masses.   External genitals are normal without lesions or rashes.  Urethral meatus and urethra are normal  Bladder is normal to palpation  Vagina is normal without discharge or bleeding.   Cervix is normal without discharge or lesion.   Uterus is normal, mobile, nontender without palpable mass.  Adnexa are normal, nontender, without palpable mass.   Rectum without abnormality     "

## 2024-12-13 LAB
C TRACH DNA SPEC QL NAA+PROBE: NEGATIVE
N GONORRHOEA DNA SPEC QL NAA+PROBE: NEGATIVE

## 2024-12-16 ENCOUNTER — RESULTS FOLLOW-UP (OUTPATIENT)
Dept: OBGYN CLINIC | Facility: CLINIC | Age: 24
End: 2024-12-16

## 2024-12-16 ENCOUNTER — HOSPITAL ENCOUNTER (OUTPATIENT)
Dept: ULTRASOUND IMAGING | Facility: CLINIC | Age: 24
Discharge: HOME | End: 2024-12-16
Payer: COMMERCIAL

## 2024-12-16 DIAGNOSIS — N63.14 MASS OF LOWER INNER QUADRANT OF RIGHT BREAST: ICD-10-CM

## 2024-12-16 PROCEDURE — 76642 ULTRASOUND BREAST LIMITED: CPT

## 2024-12-18 LAB
LAB AP GYN PRIMARY INTERPRETATION: NORMAL
Lab: NORMAL

## 2025-01-21 ENCOUNTER — OFFICE VISIT (OUTPATIENT)
Age: 25
End: 2025-01-21
Payer: COMMERCIAL

## 2025-01-21 VITALS
OXYGEN SATURATION: 98 % | SYSTOLIC BLOOD PRESSURE: 92 MMHG | TEMPERATURE: 98.7 F | RESPIRATION RATE: 18 BRPM | HEART RATE: 65 BPM | DIASTOLIC BLOOD PRESSURE: 60 MMHG | WEIGHT: 175 LBS | BODY MASS INDEX: 32.01 KG/M2

## 2025-01-21 DIAGNOSIS — U07.1 COVID-19: Primary | ICD-10-CM

## 2025-01-21 PROCEDURE — G0382 LEV 3 HOSP TYPE B ED VISIT: HCPCS | Performed by: NURSE PRACTITIONER

## 2025-01-21 PROCEDURE — S9083 URGENT CARE CENTER GLOBAL: HCPCS | Performed by: NURSE PRACTITIONER

## 2025-01-21 NOTE — LETTER
January 21, 2025     Patient: Tamika Langford   YOB: 2000   Date of Visit: 1/21/2025       To Whom It May Concern:    It is my medical opinion that Tamika Langford may return to work on 1/23/2025  .  Patient was also feeling ill on 1/20/25.     If you have any questions or concerns, please don't hesitate to call.         Sincerely,        CHRISTELLE Preston    CC: No Recipients

## 2025-01-21 NOTE — PROGRESS NOTES
Assessment/Plan    COVID-19 [U07.1]  1. COVID-19          Rapid COVID was positive   Start daily emergen C while symptoms persist   PRN afrin daily x 24-48 hrs should nose bleeds persist   Trial OTC cough syrup as instructed on label   PRN motrin/tylenol q8hrs for pain/fever  F/u with PCP as needed  Proceed to ER should symptoms worsen      Patient ID: Tamika Langford is a 24 y.o. female.      Reason For Visit / Chief Complaint  Chief Complaint   Patient presents with    Cold Like Symptoms     Symptoms started yesterday, pt complains of random bloody nose when blowing nose, sinus congestion, and dry cough.         23 y/o female presents for congestion, dry cough, hot flashes and nose bleed after blowing her nose x 24 hrs. Patient took dayquill, nyquill and tea which did not help her symptoms. Recent exposure to dad who was sick about a week ago with COVID.       History reviewed. No pertinent past medical history.    Past Surgical History:   Procedure Laterality Date    INDUCED       MI LAPS ABD PRTM&OMENTUM DX W/WO SPEC BR/WA SPX N/A 2020    Procedure: LAPAROSCOPY DIAGNOSTIC, left salpingectomy,;  Surgeon: Ivania Alston DO;  Location: [facility];  Service: Gynecology       Family History   Problem Relation Age of Onset    No Known Problems Mother     No Known Problems Father     No Known Problems Sister     No Known Problems Maternal Grandmother     No Known Problems Maternal Grandfather     No Known Problems Paternal Grandmother     No Known Problems Paternal Grandfather        Review of Systems   Constitutional:  Positive for chills.   HENT:  Positive for congestion and nosebleeds.    Eyes: Negative.    Respiratory:  Positive for cough.    Cardiovascular: Negative.    Gastrointestinal: Negative.    Endocrine: Negative.    Genitourinary: Negative.    Musculoskeletal: Negative.    Skin: Negative.    Allergic/Immunologic: Negative.    Neurological: Negative.    Hematological: Negative.     Psychiatric/Behavioral: Negative.         Objective:    BP 92/60 (BP Location: Left arm, Patient Position: Sitting, Cuff Size: Standard)   Pulse 65   Temp 98.7 °F (37.1 °C) (Tympanic)   Resp 18   Wt 79.4 kg (175 lb)   SpO2 98%   BMI 32.01 kg/m²     Physical Exam  Constitutional:       Appearance: Normal appearance.   HENT:      Head: Normocephalic and atraumatic.      Right Ear: Tympanic membrane, ear canal and external ear normal.      Left Ear: Tympanic membrane, ear canal and external ear normal.      Nose: Congestion present.      Right Nostril: No epistaxis.      Left Nostril: No epistaxis.      Right Turbinates: Swollen.      Left Turbinates: Swollen.      Mouth/Throat:      Mouth: Mucous membranes are moist.      Pharynx: Oropharynx is clear. Posterior oropharyngeal erythema present.   Eyes:      Conjunctiva/sclera: Conjunctivae normal.   Cardiovascular:      Rate and Rhythm: Normal rate.      Pulses: Normal pulses.      Heart sounds: Normal heart sounds.   Pulmonary:      Effort: Pulmonary effort is normal.   Abdominal:      Palpations: Abdomen is soft.   Musculoskeletal:         General: Normal range of motion.      Cervical back: Normal range of motion.   Skin:     General: Skin is warm and dry.      Capillary Refill: Capillary refill takes less than 2 seconds.   Neurological:      Mental Status: She is alert.   Psychiatric:         Behavior: Behavior normal.         Thought Content: Thought content normal.

## 2025-01-21 NOTE — PATIENT INSTRUCTIONS
Rapid COVID was positive   Start daily emergen C while symptoms persist   PRN afrin daily x 24-48 hrs should nose bleeds persist   Trial OTC cough syrup as instructed on label   PRN motrin/tylenol q8hrs for pain/fever  F/u with PCP as needed  Proceed to ER should symptoms worsen

## 2025-02-02 ENCOUNTER — HOSPITAL ENCOUNTER (EMERGENCY)
Facility: HOSPITAL | Age: 25
Discharge: HOME | End: 2025-02-02
Attending: EMERGENCY MEDICINE
Payer: COMMERCIAL

## 2025-02-02 ENCOUNTER — APPOINTMENT (EMERGENCY)
Dept: RADIOLOGY | Facility: HOSPITAL | Age: 25
End: 2025-02-02
Payer: COMMERCIAL

## 2025-02-02 VITALS
WEIGHT: 171.74 LBS | TEMPERATURE: 99.5 F | SYSTOLIC BLOOD PRESSURE: 115 MMHG | DIASTOLIC BLOOD PRESSURE: 59 MMHG | HEART RATE: 102 BPM | OXYGEN SATURATION: 97 % | RESPIRATION RATE: 18 BRPM | BODY MASS INDEX: 31.41 KG/M2

## 2025-02-02 DIAGNOSIS — R68.89 FLU-LIKE SYMPTOMS: Primary | ICD-10-CM

## 2025-02-02 DIAGNOSIS — T78.40XA ALLERGIC REACTION, INITIAL ENCOUNTER: ICD-10-CM

## 2025-02-02 LAB
ANION GAP SERPL CALCULATED.3IONS-SCNC: 7 MMOL/L (ref 4–13)
BASOPHILS # BLD AUTO: 0.01 THOUSANDS/ΜL (ref 0–0.1)
BASOPHILS NFR BLD AUTO: 0 % (ref 0–1)
BUN SERPL-MCNC: 7 MG/DL (ref 5–25)
CALCIUM SERPL-MCNC: 8.8 MG/DL (ref 8.4–10.2)
CHLORIDE SERPL-SCNC: 106 MMOL/L (ref 96–108)
CO2 SERPL-SCNC: 25 MMOL/L (ref 21–32)
CREAT SERPL-MCNC: 0.61 MG/DL (ref 0.6–1.3)
EOSINOPHIL # BLD AUTO: 0.02 THOUSAND/ΜL (ref 0–0.61)
EOSINOPHIL NFR BLD AUTO: 0 % (ref 0–6)
ERYTHROCYTE [DISTWIDTH] IN BLOOD BY AUTOMATED COUNT: 12.4 % (ref 11.6–15.1)
FLUAV AG UPPER RESP QL IA.RAPID: NEGATIVE
FLUBV AG UPPER RESP QL IA.RAPID: NEGATIVE
GFR SERPL CREATININE-BSD FRML MDRD: 127 ML/MIN/1.73SQ M
GLUCOSE SERPL-MCNC: 146 MG/DL (ref 65–140)
HCT VFR BLD AUTO: 37.5 % (ref 34.8–46.1)
HGB BLD-MCNC: 12.6 G/DL (ref 11.5–15.4)
IMM GRANULOCYTES # BLD AUTO: 0.01 THOUSAND/UL (ref 0–0.2)
IMM GRANULOCYTES NFR BLD AUTO: 0 % (ref 0–2)
LYMPHOCYTES # BLD AUTO: 0.46 THOUSANDS/ΜL (ref 0.6–4.47)
LYMPHOCYTES NFR BLD AUTO: 9 % (ref 14–44)
MCH RBC QN AUTO: 29.9 PG (ref 26.8–34.3)
MCHC RBC AUTO-ENTMCNC: 33.6 G/DL (ref 31.4–37.4)
MCV RBC AUTO: 89 FL (ref 82–98)
MONOCYTES # BLD AUTO: 0.48 THOUSAND/ΜL (ref 0.17–1.22)
MONOCYTES NFR BLD AUTO: 10 % (ref 4–12)
NEUTROPHILS # BLD AUTO: 4.09 THOUSANDS/ΜL (ref 1.85–7.62)
NEUTS SEG NFR BLD AUTO: 81 % (ref 43–75)
NRBC BLD AUTO-RTO: 0 /100 WBCS
PLATELET # BLD AUTO: 198 THOUSANDS/UL (ref 149–390)
PMV BLD AUTO: 10.1 FL (ref 8.9–12.7)
POTASSIUM SERPL-SCNC: 3.4 MMOL/L (ref 3.5–5.3)
RBC # BLD AUTO: 4.21 MILLION/UL (ref 3.81–5.12)
SARS-COV+SARS-COV-2 AG RESP QL IA.RAPID: NEGATIVE
SODIUM SERPL-SCNC: 138 MMOL/L (ref 135–147)
WBC # BLD AUTO: 5.07 THOUSAND/UL (ref 4.31–10.16)

## 2025-02-02 PROCEDURE — 96375 TX/PRO/DX INJ NEW DRUG ADDON: CPT

## 2025-02-02 PROCEDURE — 93005 ELECTROCARDIOGRAM TRACING: CPT

## 2025-02-02 PROCEDURE — 96376 TX/PRO/DX INJ SAME DRUG ADON: CPT

## 2025-02-02 PROCEDURE — 36415 COLL VENOUS BLD VENIPUNCTURE: CPT | Performed by: EMERGENCY MEDICINE

## 2025-02-02 PROCEDURE — 87811 SARS-COV-2 COVID19 W/OPTIC: CPT | Performed by: EMERGENCY MEDICINE

## 2025-02-02 PROCEDURE — 85025 COMPLETE CBC W/AUTO DIFF WBC: CPT | Performed by: EMERGENCY MEDICINE

## 2025-02-02 PROCEDURE — 96365 THER/PROPH/DIAG IV INF INIT: CPT

## 2025-02-02 PROCEDURE — 87804 INFLUENZA ASSAY W/OPTIC: CPT | Performed by: EMERGENCY MEDICINE

## 2025-02-02 PROCEDURE — 96361 HYDRATE IV INFUSION ADD-ON: CPT

## 2025-02-02 PROCEDURE — 99284 EMERGENCY DEPT VISIT MOD MDM: CPT | Performed by: EMERGENCY MEDICINE

## 2025-02-02 PROCEDURE — 99283 EMERGENCY DEPT VISIT LOW MDM: CPT

## 2025-02-02 PROCEDURE — 71046 X-RAY EXAM CHEST 2 VIEWS: CPT

## 2025-02-02 PROCEDURE — 80048 BASIC METABOLIC PNL TOTAL CA: CPT | Performed by: EMERGENCY MEDICINE

## 2025-02-02 RX ORDER — FAMOTIDINE 20 MG/1
20 TABLET, FILM COATED ORAL 2 TIMES DAILY
Qty: 30 TABLET | Refills: 0 | Status: SHIPPED | OUTPATIENT
Start: 2025-02-02

## 2025-02-02 RX ORDER — KETOROLAC TROMETHAMINE 30 MG/ML
15 INJECTION, SOLUTION INTRAMUSCULAR; INTRAVENOUS ONCE
Status: COMPLETED | OUTPATIENT
Start: 2025-02-02 | End: 2025-02-02

## 2025-02-02 RX ORDER — DIPHENHYDRAMINE HYDROCHLORIDE 50 MG/ML
25 INJECTION INTRAMUSCULAR; INTRAVENOUS ONCE
Status: COMPLETED | OUTPATIENT
Start: 2025-02-02 | End: 2025-02-02

## 2025-02-02 RX ORDER — METHYLPREDNISOLONE SODIUM SUCCINATE 125 MG/2ML
60 INJECTION, POWDER, LYOPHILIZED, FOR SOLUTION INTRAMUSCULAR; INTRAVENOUS ONCE
Status: COMPLETED | OUTPATIENT
Start: 2025-02-02 | End: 2025-02-02

## 2025-02-02 RX ORDER — FAMOTIDINE 10 MG/ML
20 INJECTION, SOLUTION INTRAVENOUS ONCE
Status: COMPLETED | OUTPATIENT
Start: 2025-02-02 | End: 2025-02-02

## 2025-02-02 RX ORDER — PREDNISONE 20 MG/1
40 TABLET ORAL DAILY
Qty: 8 TABLET | Refills: 0 | Status: SHIPPED | OUTPATIENT
Start: 2025-02-02 | End: 2025-02-06

## 2025-02-02 RX ORDER — EPINEPHRINE 0.3 MG/.3ML
0.3 INJECTION SUBCUTANEOUS ONCE
Qty: 0.6 ML | Refills: 0 | Status: SHIPPED | OUTPATIENT
Start: 2025-02-02 | End: 2025-02-02

## 2025-02-02 RX ORDER — DIPHENHYDRAMINE HCL 25 MG
25 TABLET ORAL EVERY 6 HOURS
Qty: 20 TABLET | Refills: 0 | Status: SHIPPED | OUTPATIENT
Start: 2025-02-02

## 2025-02-02 RX ORDER — ACETAMINOPHEN 10 MG/ML
1000 INJECTION, SOLUTION INTRAVENOUS ONCE
Status: COMPLETED | OUTPATIENT
Start: 2025-02-02 | End: 2025-02-02

## 2025-02-02 RX ORDER — ALBUTEROL SULFATE 90 UG/1
2 INHALANT RESPIRATORY (INHALATION) ONCE
Status: COMPLETED | OUTPATIENT
Start: 2025-02-02 | End: 2025-02-02

## 2025-02-02 RX ADMIN — DIPHENHYDRAMINE HYDROCHLORIDE 25 MG: 50 INJECTION, SOLUTION INTRAMUSCULAR; INTRAVENOUS at 21:49

## 2025-02-02 RX ADMIN — ALBUTEROL SULFATE 2 PUFF: 90 AEROSOL, METERED RESPIRATORY (INHALATION) at 21:51

## 2025-02-02 RX ADMIN — SODIUM CHLORIDE 1000 ML: 0.9 INJECTION, SOLUTION INTRAVENOUS at 20:37

## 2025-02-02 RX ADMIN — ACETAMINOPHEN 1000 MG: 10 INJECTION INTRAVENOUS at 20:32

## 2025-02-02 RX ADMIN — FAMOTIDINE 20 MG: 10 INJECTION, SOLUTION INTRAVENOUS at 20:40

## 2025-02-02 RX ADMIN — DIPHENHYDRAMINE HYDROCHLORIDE 25 MG: 50 INJECTION, SOLUTION INTRAMUSCULAR; INTRAVENOUS at 20:33

## 2025-02-02 RX ADMIN — KETOROLAC TROMETHAMINE 15 MG: 30 INJECTION, SOLUTION INTRAMUSCULAR at 20:33

## 2025-02-02 RX ADMIN — METHYLPREDNISOLONE SODIUM SUCCINATE 60 MG: 125 INJECTION, POWDER, FOR SOLUTION INTRAMUSCULAR; INTRAVENOUS at 20:33

## 2025-02-03 LAB
ATRIAL RATE: 117 BPM
P AXIS: 70 DEGREES
PR INTERVAL: 140 MS
QRS AXIS: 64 DEGREES
QRSD INTERVAL: 86 MS
QT INTERVAL: 318 MS
QTC INTERVAL: 443 MS
T WAVE AXIS: 14 DEGREES
VENTRICULAR RATE: 117 BPM

## 2025-02-03 PROCEDURE — 93010 ELECTROCARDIOGRAM REPORT: CPT | Performed by: INTERNAL MEDICINE

## 2025-02-03 NOTE — ED PROVIDER NOTES
Time reflects when diagnosis was documented in both MDM as applicable and the Disposition within this note       Time User Action Codes Description Comment    2/2/2025 10:17 PM Jojo Clayton [R68.89] Flu-like symptoms     2/2/2025 10:17 PM Jojo Clayton [T78.40XA] Allergic reaction, initial encounter           ED Disposition       ED Disposition   Discharge    Condition   Stable    Date/Time   Sun Feb 2, 2025 10:17 PM    Comment   Tamika Langford discharge to home/self care.                   Assessment & Plan       Medical Decision Making  Amount and/or Complexity of Data Reviewed  Labs: ordered.  Radiology: ordered.    Risk  OTC drugs.  Prescription drug management.           Clinically with viral illness/ possible allergic reaction, will rx steroid/antihistamines/rx for epi pen as needed  Medications   acetaminophen (Ofirmev) injection 1,000 mg (0 mg Intravenous Stopped 2/2/25 2108)   ketorolac (TORADOL) injection 15 mg (15 mg Intravenous Given 2/2/25 2033)   sodium chloride 0.9 % bolus 1,000 mL (0 mL Intravenous Stopped 2/2/25 2152)   methylPREDNISolone sodium succinate (Solu-MEDROL) injection 60 mg (60 mg Intravenous Given 2/2/25 2033)   diphenhydrAMINE (BENADRYL) injection 25 mg (25 mg Intravenous Given 2/2/25 2033)   Famotidine (PF) (PEPCID) injection 20 mg (20 mg Intravenous Given 2/2/25 2040)   albuterol (PROVENTIL HFA,VENTOLIN HFA) inhaler 2 puff (2 puffs Inhalation Given 2/2/25 2151)   diphenhydrAMINE (BENADRYL) injection 25 mg (25 mg Intravenous Given 2/2/25 2149)       ED Risk Strat Scores                          SBIRT 22yo+      Flowsheet Row Most Recent Value   Initial Alcohol Screen: US AUDIT-C     1. How often do you have a drink containing alcohol? 0 Filed at: 02/02/2025 1936   2. How many drinks containing alcohol do you have on a typical day you are drinking?  0 Filed at: 02/02/2025 1936   3b. FEMALE Any Age, or MALE 65+: How often do you have 4 or more drinks on one occassion? 0  Filed at: 2025   Audit-C Score 0 Filed at: 2025   MAYE: How many times in the past year have you...    Used an illegal drug or used a prescription medication for non-medical reasons? Never Filed at: 2025                            History of Present Illness       Chief Complaint   Patient presents with    Flu Symptoms     Patient arrived to ER c/o flu like symptoms started last night. +Dry cough +fever +hives        History reviewed. No pertinent past medical history.   Past Surgical History:   Procedure Laterality Date    INDUCED       HI LAPS ABD PRTM&OMENTUM DX W/WO SPEC BR/WA SPX N/A 2020    Procedure: LAPAROSCOPY DIAGNOSTIC, left salpingectomy,;  Surgeon: Ivania Alston DO;  Location: [facility];  Service: Gynecology      Family History   Problem Relation Age of Onset    No Known Problems Mother     No Known Problems Father     No Known Problems Sister     No Known Problems Maternal Grandmother     No Known Problems Maternal Grandfather     No Known Problems Paternal Grandmother     No Known Problems Paternal Grandfather       Social History     Tobacco Use    Smoking status: Never    Smokeless tobacco: Never   Vaping Use    Vaping status: Never Used   Substance Use Topics    Alcohol use: No    Drug use: No      E-Cigarette/Vaping    E-Cigarette Use Never User       E-Cigarette/Vaping Substances    Nicotine No     THC No     CBD No     Flavoring No     Other No     Unknown No       I have reviewed and agree with the history as documented.     HPI  23 yo F presents with flu-like symptoms starting last night. She reports being diagnosed with COVID approximately 2 weeks ago, recovered from that illness and started with fevers, cough, body aches last night. Also reports itchy rash across neck. Unsure of any environmental exposures, soaps/lotions. Denies any medical problems, not on any medications.  Review of Systems   Constitutional:  Positive for fever. Negative for  chills.   HENT:  Negative for dental problem and ear pain.    Eyes:  Negative for pain and redness.   Respiratory:  Positive for cough. Negative for shortness of breath.    Cardiovascular:  Negative for chest pain and palpitations.   Gastrointestinal:  Negative for abdominal pain and nausea.   Endocrine: Negative for polydipsia and polyphagia.   Genitourinary:  Negative for dysuria and frequency.   Musculoskeletal:  Positive for myalgias. Negative for arthralgias and joint swelling.   Skin:  Positive for rash. Negative for color change.   Neurological:  Negative for dizziness and headaches.   Psychiatric/Behavioral:  Negative for behavioral problems and confusion.    All other systems reviewed and are negative.          Objective       ED Triage Vitals [02/02/25 1935]   Temperature Pulse Blood Pressure Respirations SpO2 Patient Position - Orthostatic VS   (!) 101.7 °F (38.7 °C) (!) 128 125/59 20 95 % --      Temp Source Heart Rate Source BP Location FiO2 (%) Pain Score    Oral Monitor Left arm -- --      Vitals      Date and Time Temp Pulse SpO2 Resp BP Pain Score FACES Pain Rating User   02/02/25 2130 -- 102 97 % 18 115/59 -- -- KG   02/02/25 2100 99.5 °F (37.5 °C) 108 96 % 18 123/59 -- -- KG   02/02/25 1935 101.7 °F (38.7 °C) 128 95 % 20 125/59 -- -- BS            Physical Exam  Vitals and nursing note reviewed.   Constitutional:       General: She is not in acute distress.     Appearance: She is well-developed. She is not diaphoretic.   HENT:      Head: Atraumatic.      Right Ear: External ear normal.      Left Ear: External ear normal.      Nose: Nose normal.   Eyes:      Conjunctiva/sclera: Conjunctivae normal.      Pupils: Pupils are equal, round, and reactive to light.   Neck:      Vascular: No JVD.   Cardiovascular:      Rate and Rhythm: Normal rate and regular rhythm.      Heart sounds: Normal heart sounds. No murmur heard.  Pulmonary:      Effort: Pulmonary effort is normal. No respiratory distress.       Breath sounds: Normal breath sounds. No wheezing.   Abdominal:      General: Bowel sounds are normal. There is no distension.      Palpations: Abdomen is soft.      Tenderness: There is no abdominal tenderness.   Musculoskeletal:         General: Normal range of motion.      Cervical back: Normal range of motion and neck supple.   Skin:     General: Skin is warm and dry.      Capillary Refill: Capillary refill takes less than 2 seconds.      Comments: Urticarial rash across neck   Neurological:      Mental Status: She is alert and oriented to person, place, and time.      Cranial Nerves: No cranial nerve deficit.   Psychiatric:         Behavior: Behavior normal.         Results Reviewed       Procedure Component Value Units Date/Time    Basic metabolic panel [[id number]]  (Abnormal) Collected: 02/02/25 2032    Lab Status: Final result Specimen: Blood from Arm, Right Updated: 02/02/25 2059     Sodium 138 mmol/L      Potassium 3.4 mmol/L      Chloride 106 mmol/L      CO2 25 mmol/L      ANION GAP 7 mmol/L      BUN 7 mg/dL      Creatinine 0.61 mg/dL      Glucose 146 mg/dL      Calcium 8.8 mg/dL      eGFR 127 ml/min/1.73sq m     Narrative:      National Kidney Disease Foundation guidelines for Chronic Kidney Disease (CKD):     Stage 1 with normal or high GFR (GFR > 90 mL/min/1.73 square meters)    Stage 2 Mild CKD (GFR = 60-89 mL/min/1.73 square meters)    Stage 3A Moderate CKD (GFR = 45-59 mL/min/1.73 square meters)    Stage 3B Moderate CKD (GFR = 30-44 mL/min/1.73 square meters)    Stage 4 Severe CKD (GFR = 15-29 mL/min/1.73 square meters)    Stage 5 End Stage CKD (GFR <15 mL/min/1.73 square meters)  Note: GFR calculation is accurate only with a steady state creatinine    CBC and differential [[id number]]  (Abnormal) Collected: 02/02/25 2032    Lab Status: Final result Specimen: Blood from Arm, Right Updated: 02/02/25 2041     WBC 5.07 Thousand/uL      RBC 4.21 Million/uL      Hemoglobin 12.6 g/dL      Hematocrit 37.5 %       MCV 89 fL      MCH 29.9 pg      MCHC 33.6 g/dL      RDW 12.4 %      MPV 10.1 fL      Platelets 198 Thousands/uL      nRBC 0 /100 WBCs      Segmented % 81 %      Immature Grans % 0 %      Lymphocytes % 9 %      Monocytes % 10 %      Eosinophils Relative 0 %      Basophils Relative 0 %      Absolute Neutrophils 4.09 Thousands/µL      Absolute Immature Grans 0.01 Thousand/uL      Absolute Lymphocytes 0.46 Thousands/µL      Absolute Monocytes 0.48 Thousand/µL      Eosinophils Absolute 0.02 Thousand/µL      Basophils Absolute 0.01 Thousands/µL     FLU/COVID Rapid Antigen (30 min. TAT) - Preferred screening test in ED [[id number]]  (Normal) Collected: 02/02/25 1939    Lab Status: Final result Specimen: Nares from Nose Updated: 02/02/25 2014     SARS COV Rapid Antigen Negative     Influenza A Rapid Antigen Negative     Influenza B Rapid Antigen Negative    Narrative:      This test has been performed using the [employer] Maryam 2 FLU+SARS Antigen test under the Emergency Use Authorization (EUA). This test has been validated by the  and verified by the performing laboratory. The Maryam uses lateral flow immunofluorescent sandwich assay to detect SARS-COV, Influenza A and Influenza B Antigen.     The Quidel Maryam 2 SARS Antigen test does not differentiate between SARS-CoV and SARS-CoV-2.     Negative results are presumptive and may be confirmed with a molecular assay, if necessary, for patient management. Negative results do not rule out SARS-CoV-2 or influenza infection and should not be used as the sole basis for treatment or patient management decisions. A negative test result may occur if the level of antigen in a sample is below the limit of detection of this test.     Positive results are indicative of the presence of viral antigens, but do not rule out bacterial infection or co-infection with other viruses.     All test results should be used as an adjunct to clinical observations and other information  available to the provider.    FOR PEDIATRIC PATIENTS - copy/paste COVID Guidelines URL to browser: [email or web address]            XR chest pa and lateral   Final Interpretation by Mike Hernandez MD (02/02 2051)      No acute cardiopulmonary disease.            Workstation performed: [id number]             Procedures    ED Medication and Procedure Management   Prior to Admission Medications   Prescriptions Last Dose Informant Patient Reported? Taking?   dicyclomine (BENTYL) 10 mg capsule   No No   Sig: Take 1 capsule (10 mg total) by mouth 4 (four) times a day (before meals and at bedtime) for 5 days   Patient not taking: Reported on 12/11/2024   ibuprofen (MOTRIN) 600 mg tablet  Self No No   Sig: Take 1 tablet (600 mg total) by mouth every 6 (six) hours as needed for mild pain   Patient not taking: Reported on 4/9/2024   medroxyPROGESTERone (DEPO-PROVERA) 150 mg/mL injection   No No   Sig: Inject 1 mL (150 mg total) into a muscle every 3 (three) months   Patient not taking: Reported on 1/21/2025   ondansetron (ZOFRAN) 4 mg tablet   No No   Sig: Take 1 tablet (4 mg total) by mouth every 6 (six) hours as needed for nausea or vomiting   Patient not taking: Reported on 9/25/2024   phenazopyridine (PYRIDIUM) 200 mg tablet   No No   Sig: Take 1 tablet (200 mg total) by mouth 3 (three) times a day   Patient not taking: Reported on 1/9/2024      Facility-Administered Medications: None     Discharge Medication List as of 2/2/2025 10:19 PM        START taking these medications    Details   diphenhydrAMINE (BENADRYL) 25 mg tablet Take 1 tablet (25 mg total) by mouth every 6 (six) hours, Starting Sun 2/2/2025, Normal      EPINEPHrine (EPIPEN) 0.3 mg/0.3 mL SOAJ Inject 0.3 mL (0.3 mg total) into a muscle once for 1 dose, Starting Sun 2/2/2025, Normal      famotidine (PEPCID) 20 mg tablet Take 1 tablet (20 mg total) by mouth 2 (two) times a day, Starting Sun 2/2/2025,  Normal      predniSONE 20 mg tablet Take 2 tablets (40 mg total) by mouth daily for 4 days, Starting Sun 2/2/2025, Until Thu 2/6/2025, Normal           CONTINUE these medications which have NOT CHANGED    Details   dicyclomine (BENTYL) 10 mg capsule Take 1 capsule (10 mg total) by mouth 4 (four) times a day (before meals and at bedtime) for 5 days, Starting Tue 1/9/2024, Until Mon 1/22/2024, Normal      ibuprofen (MOTRIN) 600 mg tablet Take 1 tablet (600 mg total) by mouth every 6 (six) hours as needed for mild pain, Starting Sun 4/5/2020, No Print      medroxyPROGESTERone (DEPO-PROVERA) 150 mg/mL injection Inject 1 mL (150 mg total) into a muscle every 3 (three) months, Starting Mon 1/22/2024, Normal      ondansetron (ZOFRAN) 4 mg tablet Take 1 tablet (4 mg total) by mouth every 6 (six) hours as needed for nausea or vomiting, Starting Mon 4/29/2024, Normal      phenazopyridine (PYRIDIUM) 200 mg tablet Take 1 tablet (200 mg total) by mouth 3 (three) times a day, Starting Tue 7/28/2020, Normal           No discharge procedures on file.  ED SEPSIS DOCUMENTATION   Time reflects when diagnosis was documented in both MDM as applicable and the Disposition within this note       Time User Action Codes Description Comment    2/2/2025 10:17 PM Jojo Clayton [R68.89] Flu-like symptoms     2/2/2025 10:17 PM Jojo Clayton [T78.40XA] Allergic reaction, initial encounter                  Jojo Clayton MD  02/02/25 1808

## 2025-02-03 NOTE — DISCHARGE INSTRUCTIONS
Take steroid daily, pepcid twice daily and benadryl every 4 to 6 hours. If you have allergic reaction in future where you have difficulty breathing or swallowing, use epi pen and call 911.

## 2025-03-26 ENCOUNTER — TELEPHONE (OUTPATIENT)
Age: 25
End: 2025-03-26

## 2025-03-26 ENCOUNTER — APPOINTMENT (OUTPATIENT)
Dept: LAB | Facility: HOSPITAL | Age: 25
End: 2025-03-26
Payer: COMMERCIAL

## 2025-03-26 DIAGNOSIS — Z11.3 SCREENING FOR STD (SEXUALLY TRANSMITTED DISEASE): Primary | ICD-10-CM

## 2025-03-26 DIAGNOSIS — Z11.3 SCREENING FOR STD (SEXUALLY TRANSMITTED DISEASE): ICD-10-CM

## 2025-03-26 PROCEDURE — 36415 COLL VENOUS BLD VENIPUNCTURE: CPT

## 2025-03-26 PROCEDURE — 86803 HEPATITIS C AB TEST: CPT

## 2025-03-26 PROCEDURE — 87389 HIV-1 AG W/HIV-1&-2 AB AG IA: CPT

## 2025-03-26 PROCEDURE — 86780 TREPONEMA PALLIDUM: CPT

## 2025-03-26 NOTE — TELEPHONE ENCOUNTER
Pt called in and asking and requesting if her provider can order STD screening bloodwork. Pt stated she has no symptoms but she just wants to make sure.

## 2025-03-27 LAB
HCV AB SER QL: NORMAL
HIV 1+2 AB+HIV1 P24 AG SERPL QL IA: NORMAL
TREPONEMA PALLIDUM IGG+IGM AB [PRESENCE] IN SERUM OR PLASMA BY IMMUNOASSAY: NORMAL

## 2025-03-28 ENCOUNTER — RESULTS FOLLOW-UP (OUTPATIENT)
Age: 25
End: 2025-03-28

## 2025-04-02 ENCOUNTER — OFFICE VISIT (OUTPATIENT)
Dept: OBGYN CLINIC | Facility: CLINIC | Age: 25
End: 2025-04-02
Payer: COMMERCIAL

## 2025-04-02 VITALS — DIASTOLIC BLOOD PRESSURE: 64 MMHG | SYSTOLIC BLOOD PRESSURE: 116 MMHG | BODY MASS INDEX: 31.06 KG/M2 | WEIGHT: 169.8 LBS

## 2025-04-02 DIAGNOSIS — B35.9 TINEA: Primary | ICD-10-CM

## 2025-04-02 DIAGNOSIS — Z30.09 BIRTH CONTROL COUNSELING: ICD-10-CM

## 2025-04-02 PROBLEM — R10.2 PELVIC PAIN: Status: RESOLVED | Noted: 2022-10-17 | Resolved: 2025-04-02

## 2025-04-02 PROBLEM — Z32.01 POSITIVE PREGNANCY TEST: Status: RESOLVED | Noted: 2024-01-22 | Resolved: 2025-04-02

## 2025-04-02 PROBLEM — Z30.013 ENCOUNTER FOR INITIAL PRESCRIPTION OF INJECTABLE CONTRACEPTIVE: Status: RESOLVED | Noted: 2024-01-22 | Resolved: 2025-04-02

## 2025-04-02 PROCEDURE — 99213 OFFICE O/P EST LOW 20 MIN: CPT | Performed by: NURSE PRACTITIONER

## 2025-04-02 RX ORDER — CLOTRIMAZOLE AND BETAMETHASONE DIPROPIONATE 10; .64 MG/G; MG/G
CREAM TOPICAL 2 TIMES DAILY
Qty: 45 G | Refills: 1 | Status: SHIPPED | OUTPATIENT
Start: 2025-04-02 | End: 2025-04-16

## 2025-04-02 NOTE — PATIENT INSTRUCTIONS
Patient Education     Fungal Skin Rash ED   General Information   You came to the Emergency Department (ED) for a skin rash. The doctors believe the rash is caused by a fungus. Ringworm, jock itch, and athlete’s foot are all kinds of fungal skin rash. These rashes can be easy to spread from one person to another or from pets to people. You can also spread it from one part of your body to another.  The doctor may order pills, creams, lotions, or powders to treat your skin rash. Be sure to read all labels and follow directions carefully. It is important not to stop treatment early, even if your rash starts to look better.  What care is needed at home?   Call your regular doctor to let them know you were in the ED. Make a follow-up appointment if you were told to.  Take or use all your medicines as ordered.  If the fungal infection is on your feet:  Keep your feet clean and dry. Dry your feet well after you shower, take a bath, or swim. Take extra care to dry between your toes.  Wear slippers or sandals when at the pool, gym, or in public areas.  Change socks at least one time each day.  If the fungal infection is on your groin:  Keep your groin area clean and dry. Dry your groin well after you shower, take a bath, or swim.  Change underwear at least one time each day.  If the fungal infection is on your scalp:  Get rid of martinez, brushes, and items that could have fungus on them.  Do not share martinez, brushes, or hats with others.  Wash with soap and shampoo after sports or exercise.  Do not share unwashed clothes or towels with other people.  If the fungal infection may have come from a pet, have the pet checked by a vet. Your pet may need special medicine as well.  When do I need to get emergency help?   Call for an ambulance right away if:   You start to have severe trouble breathing or swallowing (for example, you cannot speak in full sentences).  Return to the ED if:   It is becoming hard to breathe, but you can  still talk in full sentences.  You have a fever of 100.4°F (38°C) or higher or chills.  You have signs of a worsening skin infection like swelling, redness, warmth, pain, or drainage.  When do I need to call the doctor?   Your skin rash does not improve after 7 to 10 days.  You have new or worsening symptoms.  Last Reviewed Date   2021-03-15  Consumer Information Use and Disclaimer   This generalized information is a limited summary of diagnosis, treatment, and/or medication information. It is not meant to be comprehensive and should be used as a tool to help the user understand and/or assess potential diagnostic and treatment options. It does NOT include all information about conditions, treatments, medications, side effects, or risks that may apply to a specific patient. It is not intended to be medical advice or a substitute for the medical advice, diagnosis, or treatment of a health care provider based on the health care provider's examination and assessment of a patient’s specific and unique circumstances. Patients must speak with a health care provider for complete information about their health, medical questions, and treatment options, including any risks or benefits regarding use of medications. This information does not endorse any treatments or medications as safe, effective, or approved for treating a specific patient. [employer] and its affiliates disclaim any warranty or liability relating to this information or the use thereof. The use of this information is governed by the Terms of Use, available at https://www.Beb[employer].com/en/know/clinical-effectiveness-terms   Copyright   Copyright © 2024 [employer], [employer] and its affiliates and/or licensors. All rights reserved.

## 2025-04-02 NOTE — PROGRESS NOTES
Name: Tamika Langford      : 2000      MRN: [id number]  Encounter Provider: CHRISTELLE Haile  Encounter Date: 2025   Encounter department: [facility]  :  Assessment & Plan  Tinea    Orders:    clotrimazole-betamethasone (LOTRISONE) 1-0.05 % cream; Apply topically 2 (two) times a day for 14 days    Birth control counseling  Return for Mirena placement at patient's earliest convenience.           History of Present Illness   HPI  Tamika Langford is a 24 y.o. female who presents for rash which she developed a week or 2 ago.  She states she was on antibiotics a month ago for COVID and the flu.  She has been using cortisone to the rash and it has helped.  She also was inquiring about her menses which have not started since stopping Depo in December.  She would like a Mirena when her first cycle happens.  I did advise she could get pregnant if she is waiting for her cycle.  I encouraged condoms.  She will call when she is ready for her IUD.  She states she has had a Mirena IUD before and done well with it.    History reviewed. No pertinent past medical history.  Past Surgical History:   Procedure Laterality Date    INDUCED       ND LAPS ABD PRTM&OMENTUM DX W/WO SPEC BR/WA SPX N/A 2020    Procedure: LAPAROSCOPY DIAGNOSTIC, left salpingectomy,;  Surgeon: Ivania Alston DO;  Location: [facility];  Service: Gynecology       Current Outpatient Medications:     EPINEPHrine (EPIPEN) 0.3 mg/0.3 mL SOAJ, Inject 0.3 mL (0.3 mg total) into a muscle once for 1 dose, Disp: 0.6 mL, Rfl: 0    dicyclomine (BENTYL) 10 mg capsule, Take 1 capsule (10 mg total) by mouth 4 (four) times a day (before meals and at bedtime) for 5 days (Patient not taking: Reported on 2024), Disp: 20 capsule, Rfl: 0    diphenhydrAMINE (BENADRYL) 25 mg tablet, Take 1 tablet (25 mg total) by mouth every 6 (six) hours (Patient not taking: Reported on 2025), Disp: 20  tablet, Rfl: 0    famotidine (PEPCID) 20 mg tablet, Take 1 tablet (20 mg total) by mouth 2 (two) times a day (Patient not taking: Reported on 4/2/2025), Disp: 30 tablet, Rfl: 0    ibuprofen (MOTRIN) 600 mg tablet, Take 1 tablet (600 mg total) by mouth every 6 (six) hours as needed for mild pain (Patient not taking: Reported on 4/9/2024), Disp: 30 tablet, Rfl: 0    medroxyPROGESTERone (DEPO-PROVERA) 150 mg/mL injection, Inject 1 mL (150 mg total) into a muscle every 3 (three) months (Patient not taking: Reported on 4/2/2025), Disp: 1 mL, Rfl: 4    ondansetron (ZOFRAN) 4 mg tablet, Take 1 tablet (4 mg total) by mouth every 6 (six) hours as needed for nausea or vomiting (Patient not taking: Reported on 4/2/2025), Disp: 12 tablet, Rfl: 0    phenazopyridine (PYRIDIUM) 200 mg tablet, Take 1 tablet (200 mg total) by mouth 3 (three) times a day (Patient not taking: Reported on 4/2/2025), Disp: 6 tablet, Rfl: 0    Review of Systems   Constitutional:  Negative for chills, fatigue, fever and unexpected weight change.   Respiratory:  Negative for shortness of breath.    Gastrointestinal:  Negative for anal bleeding, blood in stool, constipation and diarrhea.   Genitourinary:  Negative for difficulty urinating, dysuria and hematuria.   Neurological:  Negative for weakness.   Psychiatric/Behavioral:  Negative for agitation, behavioral problems and confusion.           Objective   /64 (BP Location: Left arm, Patient Position: Sitting, Cuff Size: Standard)   Wt 77 kg (169 lb 12.8 oz)   LMP  (LMP Unknown)   BMI 31.06 kg/m²      Physical Exam  Constitutional:       General: She is not in acute distress.     Appearance: She is well-developed. She is not diaphoretic.   Abdominal:      Palpations: Abdomen is soft.      Hernia: There is no hernia in the left inguinal area.   Genitourinary:     Labia:         Right: No rash, tenderness, lesion or injury.         Left: No rash, tenderness, lesion or injury.       Vagina: No signs  of injury and foreign body. No vaginal discharge, erythema, tenderness or bleeding.      Cervix: No cervical motion tenderness, discharge or friability.      Uterus: Not deviated, not enlarged, not fixed and not tender.       Adnexa:         Right: No mass, tenderness or fullness.          Left: No mass, tenderness or fullness.        Comments: Mild erythema noted in the lower labial areas consistent with yeast.  Psychiatric:         Behavior: Behavior is cooperative.

## 2025-05-23 ENCOUNTER — PROCEDURE VISIT (OUTPATIENT)
Dept: OBGYN CLINIC | Facility: CLINIC | Age: 25
End: 2025-05-23

## 2025-05-23 VITALS — BODY MASS INDEX: 30.65 KG/M2 | SYSTOLIC BLOOD PRESSURE: 110 MMHG | DIASTOLIC BLOOD PRESSURE: 76 MMHG | WEIGHT: 167.6 LBS

## 2025-05-23 DIAGNOSIS — Z32.02 NEGATIVE PREGNANCY TEST: Primary | ICD-10-CM

## 2025-05-23 LAB — SL AMB POCT URINE HCG: NORMAL

## 2025-05-23 NOTE — PATIENT INSTRUCTIONS
"Patient Education     Intrauterine devices (IUDs)   The Basics   Written by the doctors and editors at [facility]   What is an intrauterine device? -- An intrauterine device (\"IUD\") is a type of birth control. It is a small, T-shaped device. A doctor or nurse puts an IUD in your uterus by going through your vagina and cervix (figure 1).  IUDs are made of flexible plastic and have 2 thin plastic strings that hang out of the cervix. They are very small, a little more than 1 inch (2.5 cm) in width and length.  An IUD is one of the safest, most effective methods for preventing pregnancy. It is a good choice for people, including teens, who do not want to get pregnant for at least 1 year. An IUD can also be used to prevent pregnancy if it is put in within 5 days after you have unprotected sex. This is known as \"emergency contraception.\"  You can also use IUDs for reasons other than birth control. For example, 1 type of IUD can be used to treat heavy, painful periods.  What are the different types of IUDs? -- There are 2 categories of IUDs available in the . One type contains copper. The other type contains a hormone called \"levonorgestrel\" (figure 2):   Copper IUD - There is only 1 copper-containing IUD (brand name: Paragard). It can stay in your uterus for 10 years, or longer for some people, to prevent pregnancy. Some people who use it get heavier or longer periods than they had before getting the IUD.   Hormonal IUDs - There are 4 hormone-containing IUDs (brand names: Mirena, Liletta, Kyleena, Raissa) (figure 2). Depending on which of these you have, it can stay in your uterus for up to 8, 5, or 3 years. Many people who use hormonal IUDs have lighter, less painful periods than they had before getting the IUD. Some people stop getting a period at all, but this is not harmful. After having the IUD removed, periods usually return to normal within a month or 2.  Other types of IUDs are also available outside of the " "[place].  What are the benefits of using an IUD? -- The benefits of using an IUD include:   IUDs are very effective. Fewer than 1 in 100 people who use an IUD get pregnant during the first year of use.   You do not have to remember to do anything or take any birth control medicines on a regular basis.   IUDs have few side effects.   IUDs do not contain estrogen, a hormone that some people can't or don't want to take.   If you decide you want to get pregnant, you can have the IUD taken out.   If you use an IUD for several years, it costs less overall than many other types of birth control. That's because there are no costs after you have it inserted.   There is evidence that using an IUD lowers your risk of getting cervical cancer.  What are the downsides of using an IUD? -- The downsides of using an IUD include:   Unlike condoms, an IUD does not protect you against infections that you can catch during sex. These are called \"sexually transmitted infections\" (\"STIs\") or \"sexually transmitted diseases.\" But you and your partner(s) can use condoms to prevent spreading infections.   There is a small chance that the IUD will come out during your period. If this happens, you will need to get a new IUD. If you see your IUD in your underwear, on your pad, or in the toilet, call your doctor or nurse.   The initial cost is higher than the cost of other methods. But, there are no more costs after it is inserted.   Only a doctor or nurse can insert or remove an IUD.  You should not get an IUD if you recently had an infection that spread to your uterus and other nearby organs. This is called a \"pelvic infection.\" STIs such as chlamydia and gonorrhea can cause pelvic infections.  Which type of IUD is best for me? -- Your nurse or doctor can talk to you about the options and help you choose the right IUD for you.  A copper IUD might be a good choice if you:   Want or need to avoid hormones   Want to avoid big changes in your period, " "such as not having any periods or bleeding or spotting between periods   Want birth control for up to 10 years, or possibly longer  A hormonal IUD might be a good choice if you:   Have heavy, painful periods. These IUDs can make your periods lighter and less painful.   Have pelvic pain from a condition called \"endometriosis.\" These IUDs might help reduce the pain.   Want birth control for up to 8 years, depending on which device you choose  Does it hurt to have an IUD put in? -- You will likely feel some discomfort and slight cramping after the nurse or doctor puts the IUD in your uterus. People who have never given birth might feel more discomfort than people who have. The cramps generally go away within a day or 2. Over-the-counter pain medicines like ibuprofen (sample brand names: Advil, Motrin) or naproxen (sample brand name: Aleve) can help cramps go away faster.  After the IUD is in place, you should not be able to feel it.  Should I see a doctor or nurse? -- If you have an IUD, see your doctor or nurse right away if:   You have bad pain in your lower belly.   Your period is late or very different from normal.   You cannot feel the string of the IUD or if the string seems shorter than usual.   You think your IUD might have moved or fallen out.   You had sex with someone who has or might have an STI, or you think you have an STI.   You have an unexplained fever.  All topics are updated as new evidence becomes available and our peer review process is complete.  This topic retrieved from [employer] on: Feb 26, 2024.  Topic 45349 Version 21.0  Release: 32.2.4 - C32.56  © 2024 UpToDate, Inc. and/or its affiliates. All rights reserved.  figure 1: Female reproductive anatomy     The internal organs that make up the female reproductive system are located in the lower belly. These include the uterus, fallopian tubes, ovaries, cervix, and vagina.  The uterus has an inner lining, called the \"endometrium,\" and a thicker " "outer layer, called the \"myometrium.\"  Graphic 55112 Version 8.0  figure 2: IUDs     This picture shows 2 types of IUDs. There are different IUDs available. They are placed inside the uterus to help prevent pregnancy. Some hormonal IUDs can help reduce menstrual bleeding. The copper IUD can actually make menstrual bleeding heavier.  Graphic 41817 Version 15.0  Consumer Information Use and Disclaimer   Disclaimer: This generalized information is a limited summary of diagnosis, treatment, and/or medication information. It is not meant to be comprehensive and should be used as a tool to help the user understand and/or assess potential diagnostic and treatment options. It does NOT include all information about conditions, treatments, medications, side effects, or risks that may apply to a specific patient. It is not intended to be medical advice or a substitute for the medical advice, diagnosis, or treatment of a health care provider based on the health care provider's examination and assessment of a patient's specific and unique circumstances. Patients must speak with a health care provider for complete information about their health, medical questions, and treatment options, including any risks or benefits regarding use of medications. This information does not endorse any treatments or medications as safe, effective, or approved for treating a specific patient. UpToDate, Inc. and its affiliates disclaim any warranty or liability relating to this information or the use thereof.The use of this information is governed by the Terms of Use, available at https://www.[employer].com/en/know/clinical-effectiveness-terms. 2024© UpToDate, Inc. and its affiliates and/or licensors. All rights reserved.  Copyright   © 2024 UpToDate, Inc. and/or its affiliates. All rights reserved.    "

## 2025-05-23 NOTE — PROGRESS NOTES
Diagnoses and all orders for this visit:    Negative pregnancy test  -     POCT urine HCG    Patient had unprotected sex 1 and half weeks ago will reschedule for IUD insertion.  Patient was advised not to have intercourse for 2 weeks prior to insertion and to take 600 mg of ibuprofen 1 hour prior to insertion    See after visit summary for further information and recommendations to the above mentioned subjects which we may or may not have covered in detail during your visit     Subjective    CC: Problem visit     Tamika Langford is a 25 y.o. female   Patient was scheduled for Mirena IUD insertion.  Patient did have unprotected intercourse 1 and half weeks ago, pregnancy test in the office was negative today.  I discussed this at length today with the patient that having unprotected intercourse 1 and half weeks ago may have resulted in a early pregnancy that could not be detected at this time.  If I went ahead with placement of Mirena IUD I could interact in early pregnancy.  Patient prefers to reschedule at this time.  Patient was advised to not have intercourse for 2 weeks prior to insertion and to take 600 mg of ibuprofen 1 hour prior to insertion of IUD.    No LMP recorded (lmp unknown). (Menstrual status: Amenorrheic other).    Vitals:    25 1556   BP: 110/76   BP Location: Left arm   Weight: 76 kg (167 lb 9.6 oz)     Body mass index is 30.65 kg/m².    Review of Systems     Constitutional: Negative for chills, fatigue, fever, headaches, visual disturbances, and unexpected weight change.   Respiratory: Negative for cough, & shortness of breath.  Cardiovascular: Negative for chest pain. .    Gastrointestinal: Negative for Abd pain, nausea & vomiting, constipation and diarrhea.   Genitourinary: Negative for difficulty urinating, dysuria, hematuria, dyspareunia, unusual vaginal bleeding or discharge  Skin: Negative skin changes    Physical Exam     Constitutional: Alert & Oriented x3,  well-developed and well-nourished. No distress.   HENT: Atraumatic, Normocephalic,   Neck: Normal range of motion.   Pulmonary: Effort normal.   Abdominal: Soft. No tenderness or masses  Musculoskeletal: Normal ROM  Skin: Warm & Dry  Psychological: Normal mood, thought content, behavior & judgement

## 2025-06-03 ENCOUNTER — PROCEDURE VISIT (OUTPATIENT)
Dept: OBGYN CLINIC | Facility: CLINIC | Age: 25
End: 2025-06-03

## 2025-06-03 VITALS
BODY MASS INDEX: 30.91 KG/M2 | DIASTOLIC BLOOD PRESSURE: 68 MMHG | WEIGHT: 168 LBS | HEIGHT: 62 IN | SYSTOLIC BLOOD PRESSURE: 120 MMHG

## 2025-06-03 DIAGNOSIS — Z30.430 ENCOUNTER FOR IUD INSERTION: Primary | ICD-10-CM

## 2025-06-03 DIAGNOSIS — N91.2 AMENORRHEA: ICD-10-CM

## 2025-06-04 ENCOUNTER — APPOINTMENT (OUTPATIENT)
Dept: LAB | Facility: HOSPITAL | Age: 25
End: 2025-06-04
Payer: COMMERCIAL

## 2025-06-04 DIAGNOSIS — N91.2 AMENORRHEA: ICD-10-CM

## 2025-06-04 LAB
BASOPHILS # BLD AUTO: 0.01 THOUSANDS/ÂΜL (ref 0–0.1)
BASOPHILS NFR BLD AUTO: 0 % (ref 0–1)
EOSINOPHIL # BLD AUTO: 0.1 THOUSAND/ÂΜL (ref 0–0.61)
EOSINOPHIL NFR BLD AUTO: 2 % (ref 0–6)
ERYTHROCYTE [DISTWIDTH] IN BLOOD BY AUTOMATED COUNT: 12.6 % (ref 11.6–15.1)
EST. AVERAGE GLUCOSE BLD GHB EST-MCNC: 111 MG/DL
FSH SERPL-ACNC: 5.3 MIU/ML
HBA1C MFR BLD: 5.5 %
HCT VFR BLD AUTO: 38.4 % (ref 34.8–46.1)
HGB BLD-MCNC: 12.8 G/DL (ref 11.5–15.4)
IMM GRANULOCYTES # BLD AUTO: 0.01 THOUSAND/UL (ref 0–0.2)
IMM GRANULOCYTES NFR BLD AUTO: 0 % (ref 0–2)
LYMPHOCYTES # BLD AUTO: 1.93 THOUSANDS/ÂΜL (ref 0.6–4.47)
LYMPHOCYTES NFR BLD AUTO: 35 % (ref 14–44)
MCH RBC QN AUTO: 29.4 PG (ref 26.8–34.3)
MCHC RBC AUTO-ENTMCNC: 33.3 G/DL (ref 31.4–37.4)
MCV RBC AUTO: 88 FL (ref 82–98)
MONOCYTES # BLD AUTO: 0.26 THOUSAND/ÂΜL (ref 0.17–1.22)
MONOCYTES NFR BLD AUTO: 5 % (ref 4–12)
NEUTROPHILS # BLD AUTO: 3.24 THOUSANDS/ÂΜL (ref 1.85–7.62)
NEUTS SEG NFR BLD AUTO: 58 % (ref 43–75)
NRBC BLD AUTO-RTO: 0 /100 WBCS
PLATELET # BLD AUTO: 238 THOUSANDS/UL (ref 149–390)
PMV BLD AUTO: 9.9 FL (ref 8.9–12.7)
PROLACTIN SERPL-MCNC: 12.09 NG/ML (ref 3.34–26.72)
RBC # BLD AUTO: 4.35 MILLION/UL (ref 3.81–5.12)
TSH SERPL DL<=0.05 MIU/L-ACNC: 0.97 UIU/ML (ref 0.45–4.5)
WBC # BLD AUTO: 5.55 THOUSAND/UL (ref 4.31–10.16)

## 2025-06-04 PROCEDURE — 84443 ASSAY THYROID STIM HORMONE: CPT

## 2025-06-04 PROCEDURE — 83036 HEMOGLOBIN GLYCOSYLATED A1C: CPT

## 2025-06-04 PROCEDURE — 84146 ASSAY OF PROLACTIN: CPT

## 2025-06-04 PROCEDURE — 85025 COMPLETE CBC W/AUTO DIFF WBC: CPT

## 2025-06-04 PROCEDURE — 84402 ASSAY OF FREE TESTOSTERONE: CPT

## 2025-06-04 PROCEDURE — 84403 ASSAY OF TOTAL TESTOSTERONE: CPT

## 2025-06-04 PROCEDURE — 82627 DEHYDROEPIANDROSTERONE: CPT

## 2025-06-04 PROCEDURE — 83001 ASSAY OF GONADOTROPIN (FSH): CPT

## 2025-06-04 PROCEDURE — 83498 ASY HYDROXYPROGESTERONE 17-D: CPT

## 2025-06-04 PROCEDURE — 36415 COLL VENOUS BLD VENIPUNCTURE: CPT

## 2025-06-04 NOTE — PROGRESS NOTES
IUD Procedure    Date/Time: 6/3/2025 9:43 AM    Performed by: Betty Romero MD  Authorized by: Betty Romero MD    Other Assisting Provider: Yes (comment) (Kayleigh Whiting MD)    Verbal consent obtained?: Yes    Risks and benefits: Risks, benefits and alternatives were discussed    Consent given by:  Patient  Patient states understanding of procedure being performed: Yes    Select procedure: IUD insertion    IUD Insertion:     Pelvic exam performed: yes      Negative urine pregnancy test: yes      Cervix cleaned and prepped: yes      Speculum placed in vagina: yes      Tenaculum applied to cervix: yes      IUD inserted with no complications: yes      Strings trimmed: yes      Uterus sounded: yes      Uterus sound depth (cm):  8    IUD type:  1 each Levonorgestrel 20 MCG/DAY  Post-procedure:     Patient tolerated procedure well: yes      Patient will follow up after next period: yes

## 2025-06-04 NOTE — PROGRESS NOTES
"Name: Tamika Langford      : 2000      MRN: [id number]  Encounter Provider: Betty Romero MD  Encounter Date: 6/3/2025   Encounter department: [facility]  :  Assessment & Plan  Encounter for IUD insertion    Orders:    POCT urine HCG    IUD Procedure    levonorgestrel (MIRENA) 52 mg intrauterine device (IUD)    Amenorrhea  No menses for over 6 months. Unclear cause. Last depoprovera injection was 2024.   Evaluation for underlying causes of amenorrhea.   Orders:    TSH, 3rd generation with Free T4 reflex; Future    Prolactin; Future    Follicle stimulating hormone; Future    CBC and differential; Future    Hemoglobin A1C; Future    17-Hydroxyprogesterone; Future    Testosterone, free, total; Future    DHEA-sulfate; Future        History of Present Illness   HPI  Tamika Langford is a 25 y.o. female who presents for IUD insertion. No menses x 9 months      Review of Systems       Objective   /68 (BP Location: Left arm, Patient Position: Sitting, Cuff Size: Large)   Ht 5' 2\" (1.575 m)   Wt 76.2 kg (168 lb)   LMP  (LMP Unknown)   BMI 30.73 kg/m²      Physical Exam      "

## 2025-06-05 LAB
DHEA-S SERPL-MCNC: 265 UG/DL (ref 84.8–378)
TESTOST FREE SERPL-MCNC: 0.8 PG/ML (ref 0–4.2)
TESTOST SERPL-MCNC: 22 NG/DL (ref 13–71)

## 2025-06-06 ENCOUNTER — RESULTS FOLLOW-UP (OUTPATIENT)
Dept: LABOR AND DELIVERY | Facility: HOSPITAL | Age: 25
End: 2025-06-06

## 2025-06-07 LAB — 17OHP SERPL-MCNC: 26 NG/DL
